# Patient Record
Sex: MALE | Race: OTHER | URBAN - METROPOLITAN AREA
[De-identification: names, ages, dates, MRNs, and addresses within clinical notes are randomized per-mention and may not be internally consistent; named-entity substitution may affect disease eponyms.]

---

## 2021-10-10 ENCOUNTER — INPATIENT (INPATIENT)
Facility: HOSPITAL | Age: 31
LOS: 5 days | Discharge: HOME | End: 2021-10-16
Attending: INTERNAL MEDICINE | Admitting: INTERNAL MEDICINE
Payer: MEDICAID

## 2021-10-10 VITALS
TEMPERATURE: 93 F | SYSTOLIC BLOOD PRESSURE: 133 MMHG | WEIGHT: 160.06 LBS | HEART RATE: 100 BPM | HEIGHT: 68 IN | OXYGEN SATURATION: 91 % | DIASTOLIC BLOOD PRESSURE: 81 MMHG | RESPIRATION RATE: 36 BRPM

## 2021-10-10 LAB
ALBUMIN SERPL ELPH-MCNC: 3.8 G/DL — SIGNIFICANT CHANGE UP (ref 3.3–5)
ALP SERPL-CCNC: 129 U/L — HIGH (ref 40–120)
ALT FLD-CCNC: 187 U/L — HIGH (ref 12–78)
ANION GAP SERPL CALC-SCNC: 17 MMOL/L — SIGNIFICANT CHANGE UP (ref 5–17)
ANION GAP SERPL CALC-SCNC: 21 MMOL/L — HIGH (ref 5–17)
ANION GAP SERPL CALC-SCNC: 28 MMOL/L — HIGH (ref 5–17)
APPEARANCE UR: CLEAR — SIGNIFICANT CHANGE UP
AST SERPL-CCNC: 82 U/L — HIGH (ref 15–37)
B-OH-BUTYR SERPL-SCNC: 10.2 MMOL/L — HIGH
BACTERIA # UR AUTO: ABNORMAL
BASE EXCESS BLDA CALC-SCNC: -28.9 MMOL/L — LOW (ref -2–2)
BASE EXCESS BLDV CALC-SCNC: -16.3 MMOL/L — LOW (ref -2–2)
BASOPHILS # BLD AUTO: 0 K/UL — SIGNIFICANT CHANGE UP (ref 0–0.2)
BASOPHILS NFR BLD AUTO: 0 % — SIGNIFICANT CHANGE UP (ref 0–2)
BILIRUB SERPL-MCNC: 0.8 MG/DL — SIGNIFICANT CHANGE UP (ref 0.2–1.2)
BILIRUB UR-MCNC: NEGATIVE — SIGNIFICANT CHANGE UP
BLOOD GAS COMMENTS, VENOUS: SIGNIFICANT CHANGE UP
BLOOD GAS COMMENTS, VENOUS: SIGNIFICANT CHANGE UP
BLOOD GAS COMMENTS: SIGNIFICANT CHANGE UP
BLOOD GAS COMMENTS: SIGNIFICANT CHANGE UP
BLOOD GAS SOURCE: SIGNIFICANT CHANGE UP
BUN SERPL-MCNC: 11 MG/DL — SIGNIFICANT CHANGE UP (ref 7–23)
BUN SERPL-MCNC: 16 MG/DL — SIGNIFICANT CHANGE UP (ref 7–23)
BUN SERPL-MCNC: 22 MG/DL — SIGNIFICANT CHANGE UP (ref 7–23)
CALCIUM SERPL-MCNC: 8 MG/DL — LOW (ref 8.5–10.1)
CALCIUM SERPL-MCNC: 8.2 MG/DL — LOW (ref 8.5–10.1)
CALCIUM SERPL-MCNC: 8.3 MG/DL — LOW (ref 8.5–10.1)
CHLORIDE SERPL-SCNC: 100 MMOL/L — SIGNIFICANT CHANGE UP (ref 96–108)
CHLORIDE SERPL-SCNC: 102 MMOL/L — SIGNIFICANT CHANGE UP (ref 96–108)
CHLORIDE SERPL-SCNC: 88 MMOL/L — LOW (ref 96–108)
CK SERPL-CCNC: 323 U/L — HIGH (ref 26–308)
CO2 SERPL-SCNC: 12 MMOL/L — LOW (ref 22–31)
CO2 SERPL-SCNC: 5 MMOL/L — CRITICAL LOW (ref 22–31)
CO2 SERPL-SCNC: 8 MMOL/L — CRITICAL LOW (ref 22–31)
COLOR SPEC: YELLOW — SIGNIFICANT CHANGE UP
CREAT SERPL-MCNC: 0.57 MG/DL — SIGNIFICANT CHANGE UP (ref 0.5–1.3)
CREAT SERPL-MCNC: 0.6 MG/DL — SIGNIFICANT CHANGE UP (ref 0.5–1.3)
CREAT SERPL-MCNC: 1.09 MG/DL — SIGNIFICANT CHANGE UP (ref 0.5–1.3)
DIFF PNL FLD: ABNORMAL
EOSINOPHIL # BLD AUTO: 0 K/UL — SIGNIFICANT CHANGE UP (ref 0–0.5)
EOSINOPHIL NFR BLD AUTO: 0 % — SIGNIFICANT CHANGE UP (ref 0–6)
ETHANOL SERPL-MCNC: <10 MG/DL — SIGNIFICANT CHANGE UP (ref 0–10)
FLUAV AG NPH QL: SIGNIFICANT CHANGE UP
FLUBV AG NPH QL: SIGNIFICANT CHANGE UP
GAS PNL BLDV: SIGNIFICANT CHANGE UP
GLUCOSE BLDC GLUCOMTR-MCNC: 167 MG/DL — HIGH (ref 70–99)
GLUCOSE BLDC GLUCOMTR-MCNC: 174 MG/DL — HIGH (ref 70–99)
GLUCOSE BLDC GLUCOMTR-MCNC: 175 MG/DL — HIGH (ref 70–99)
GLUCOSE BLDC GLUCOMTR-MCNC: 182 MG/DL — HIGH (ref 70–99)
GLUCOSE BLDC GLUCOMTR-MCNC: 184 MG/DL — HIGH (ref 70–99)
GLUCOSE BLDC GLUCOMTR-MCNC: 193 MG/DL — HIGH (ref 70–99)
GLUCOSE BLDC GLUCOMTR-MCNC: 235 MG/DL — HIGH (ref 70–99)
GLUCOSE BLDC GLUCOMTR-MCNC: 285 MG/DL — HIGH (ref 70–99)
GLUCOSE BLDC GLUCOMTR-MCNC: 293 MG/DL — HIGH (ref 70–99)
GLUCOSE BLDC GLUCOMTR-MCNC: 495 MG/DL — CRITICAL HIGH (ref 70–99)
GLUCOSE BLDC GLUCOMTR-MCNC: 538 MG/DL — CRITICAL HIGH (ref 70–99)
GLUCOSE SERPL-MCNC: 174 MG/DL — HIGH (ref 70–99)
GLUCOSE SERPL-MCNC: 177 MG/DL — HIGH (ref 70–99)
GLUCOSE SERPL-MCNC: 497 MG/DL — CRITICAL HIGH (ref 70–99)
GLUCOSE UR QL: 1000 MG/DL
HCO3 BLDA-SCNC: 2 MMOL/L — LOW (ref 21–29)
HCO3 BLDV-SCNC: 9 MMOL/L — LOW (ref 21–29)
HCT VFR BLD CALC: 42.8 % — SIGNIFICANT CHANGE UP (ref 39–50)
HGB BLD-MCNC: 14.1 G/DL — SIGNIFICANT CHANGE UP (ref 13–17)
HOROWITZ INDEX BLDA+IHG-RTO: 0.21 — SIGNIFICANT CHANGE UP
HOROWITZ INDEX BLDV+IHG-RTO: 21 — SIGNIFICANT CHANGE UP
HYPOSEGMENTATION: PRESENT — SIGNIFICANT CHANGE UP
KETONES UR-MCNC: ABNORMAL
LACTATE SERPL-SCNC: 1.9 MMOL/L — SIGNIFICANT CHANGE UP (ref 0.7–2)
LEUKOCYTE ESTERASE UR-ACNC: NEGATIVE — SIGNIFICANT CHANGE UP
LYMPHOCYTES # BLD AUTO: 1.22 K/UL — SIGNIFICANT CHANGE UP (ref 1–3.3)
LYMPHOCYTES # BLD AUTO: 5 % — LOW (ref 13–44)
MAGNESIUM SERPL-MCNC: 1.9 MG/DL — SIGNIFICANT CHANGE UP (ref 1.6–2.6)
MAGNESIUM SERPL-MCNC: 2.3 MG/DL — SIGNIFICANT CHANGE UP (ref 1.6–2.6)
MAGNESIUM SERPL-MCNC: 2.6 MG/DL — SIGNIFICANT CHANGE UP (ref 1.6–2.6)
MANUAL SMEAR VERIFICATION: SIGNIFICANT CHANGE UP
MCHC RBC-ENTMCNC: 30.8 PG — SIGNIFICANT CHANGE UP (ref 27–34)
MCHC RBC-ENTMCNC: 32.9 GM/DL — SIGNIFICANT CHANGE UP (ref 32–36)
MCV RBC AUTO: 93.4 FL — SIGNIFICANT CHANGE UP (ref 80–100)
MONOCYTES # BLD AUTO: 0.73 K/UL — SIGNIFICANT CHANGE UP (ref 0–0.9)
MONOCYTES NFR BLD AUTO: 3 % — SIGNIFICANT CHANGE UP (ref 2–14)
NEUTROPHILS # BLD AUTO: 22.36 K/UL — HIGH (ref 1.8–7.4)
NEUTROPHILS NFR BLD AUTO: 77 % — SIGNIFICANT CHANGE UP (ref 43–77)
NEUTS BAND # BLD: 15 % — HIGH (ref 0–8)
NEUTS VAC BLD QL SMEAR: SLIGHT — SIGNIFICANT CHANGE UP
NITRITE UR-MCNC: NEGATIVE — SIGNIFICANT CHANGE UP
NRBC # BLD: 1 /100 — HIGH (ref 0–0)
NRBC # BLD: SIGNIFICANT CHANGE UP /100 WBCS (ref 0–0)
PCO2 BLDA: 10 MMHG — LOW (ref 32–46)
PCO2 BLDV: 19 MMHG — LOW (ref 35–50)
PH BLD: 6.97 — CRITICAL LOW (ref 7.35–7.45)
PH BLDV: 7.29 — LOW (ref 7.35–7.45)
PH UR: 5 — SIGNIFICANT CHANGE UP (ref 5–8)
PHOSPHATE SERPL-MCNC: 0.9 MG/DL — CRITICAL LOW (ref 2.5–4.5)
PHOSPHATE SERPL-MCNC: 0.9 MG/DL — CRITICAL LOW (ref 2.5–4.5)
PLAT MORPH BLD: NORMAL — SIGNIFICANT CHANGE UP
PLATELET # BLD AUTO: 246 K/UL — SIGNIFICANT CHANGE UP (ref 150–400)
PO2 BLDA: 108 MMHG — SIGNIFICANT CHANGE UP (ref 74–108)
PO2 BLDV: 145 MMHG — HIGH (ref 25–45)
POLYCHROMASIA BLD QL SMEAR: SLIGHT — SIGNIFICANT CHANGE UP
POTASSIUM SERPL-MCNC: 3 MMOL/L — LOW (ref 3.5–5.3)
POTASSIUM SERPL-MCNC: 3.7 MMOL/L — SIGNIFICANT CHANGE UP (ref 3.5–5.3)
POTASSIUM SERPL-MCNC: 3.9 MMOL/L — SIGNIFICANT CHANGE UP (ref 3.5–5.3)
POTASSIUM SERPL-SCNC: 3 MMOL/L — LOW (ref 3.5–5.3)
POTASSIUM SERPL-SCNC: 3.7 MMOL/L — SIGNIFICANT CHANGE UP (ref 3.5–5.3)
POTASSIUM SERPL-SCNC: 3.9 MMOL/L — SIGNIFICANT CHANGE UP (ref 3.5–5.3)
PROT SERPL-MCNC: 8 GM/DL — SIGNIFICANT CHANGE UP (ref 6–8.3)
PROT UR-MCNC: 30 MG/DL
RBC # BLD: 4.58 M/UL — SIGNIFICANT CHANGE UP (ref 4.2–5.8)
RBC # FLD: 12.2 % — SIGNIFICANT CHANGE UP (ref 10.3–14.5)
RBC BLD AUTO: ABNORMAL
RBC CASTS # UR COMP ASSIST: ABNORMAL /HPF (ref 0–4)
SAO2 % BLDA: 96 % — SIGNIFICANT CHANGE UP (ref 92–96)
SAO2 % BLDV: 78 % — SIGNIFICANT CHANGE UP (ref 67–88)
SARS-COV-2 RNA SPEC QL NAA+PROBE: SIGNIFICANT CHANGE UP
SODIUM SERPL-SCNC: 121 MMOL/L — LOW (ref 135–145)
SODIUM SERPL-SCNC: 129 MMOL/L — LOW (ref 135–145)
SODIUM SERPL-SCNC: 131 MMOL/L — LOW (ref 135–145)
SP GR SPEC: 1.02 — SIGNIFICANT CHANGE UP (ref 1.01–1.02)
TOXIC GRANULES BLD QL SMEAR: PRESENT — SIGNIFICANT CHANGE UP
TROPONIN I SERPL-MCNC: <.015 NG/ML — SIGNIFICANT CHANGE UP (ref 0.01–0.04)
UROBILINOGEN FLD QL: NEGATIVE MG/DL — SIGNIFICANT CHANGE UP
WBC # BLD: 24.3 K/UL — HIGH (ref 3.8–10.5)
WBC # FLD AUTO: 24.3 K/UL — HIGH (ref 3.8–10.5)
WBC UR QL: SIGNIFICANT CHANGE UP

## 2021-10-10 PROCEDURE — 71045 X-RAY EXAM CHEST 1 VIEW: CPT | Mod: 26

## 2021-10-10 PROCEDURE — 71260 CT THORAX DX C+: CPT | Mod: 26,MA

## 2021-10-10 PROCEDURE — 70450 CT HEAD/BRAIN W/O DYE: CPT | Mod: 26,MA

## 2021-10-10 PROCEDURE — 99291 CRITICAL CARE FIRST HOUR: CPT

## 2021-10-10 PROCEDURE — 74177 CT ABD & PELVIS W/CONTRAST: CPT | Mod: 26,MA

## 2021-10-10 PROCEDURE — 93010 ELECTROCARDIOGRAM REPORT: CPT

## 2021-10-10 RX ORDER — CHLORHEXIDINE GLUCONATE 213 G/1000ML
1 SOLUTION TOPICAL
Refills: 0 | Status: DISCONTINUED | OUTPATIENT
Start: 2021-10-10 | End: 2021-10-16

## 2021-10-10 RX ORDER — INFLUENZA VIRUS VACCINE 15; 15; 15; 15 UG/.5ML; UG/.5ML; UG/.5ML; UG/.5ML
0.5 SUSPENSION INTRAMUSCULAR ONCE
Refills: 0 | Status: DISCONTINUED | OUTPATIENT
Start: 2021-10-10 | End: 2021-10-16

## 2021-10-10 RX ORDER — INSULIN HUMAN 100 [IU]/ML
6 INJECTION, SOLUTION SUBCUTANEOUS
Qty: 100 | Refills: 0 | Status: DISCONTINUED | OUTPATIENT
Start: 2021-10-10 | End: 2021-10-11

## 2021-10-10 RX ORDER — CEFTRIAXONE 500 MG/1
1000 INJECTION, POWDER, FOR SOLUTION INTRAMUSCULAR; INTRAVENOUS ONCE
Refills: 0 | Status: COMPLETED | OUTPATIENT
Start: 2021-10-10 | End: 2021-10-10

## 2021-10-10 RX ORDER — POTASSIUM CHLORIDE 20 MEQ
10 PACKET (EA) ORAL
Refills: 0 | Status: COMPLETED | OUTPATIENT
Start: 2021-10-10 | End: 2021-10-11

## 2021-10-10 RX ORDER — ENOXAPARIN SODIUM 100 MG/ML
30 INJECTION SUBCUTANEOUS DAILY
Refills: 0 | Status: DISCONTINUED | OUTPATIENT
Start: 2021-10-10 | End: 2021-10-10

## 2021-10-10 RX ORDER — CEFTRIAXONE 500 MG/1
INJECTION, POWDER, FOR SOLUTION INTRAMUSCULAR; INTRAVENOUS
Refills: 0 | Status: DISCONTINUED | OUTPATIENT
Start: 2021-10-10 | End: 2021-10-12

## 2021-10-10 RX ORDER — MAGNESIUM SULFATE 500 MG/ML
2 VIAL (ML) INJECTION ONCE
Refills: 0 | Status: COMPLETED | OUTPATIENT
Start: 2021-10-10 | End: 2021-10-10

## 2021-10-10 RX ORDER — INSULIN HUMAN 100 [IU]/ML
10 INJECTION, SOLUTION SUBCUTANEOUS ONCE
Refills: 0 | Status: COMPLETED | OUTPATIENT
Start: 2021-10-10 | End: 2021-10-10

## 2021-10-10 RX ORDER — CEFTRIAXONE 500 MG/1
1000 INJECTION, POWDER, FOR SOLUTION INTRAMUSCULAR; INTRAVENOUS EVERY 24 HOURS
Refills: 0 | Status: DISCONTINUED | OUTPATIENT
Start: 2021-10-11 | End: 2021-10-12

## 2021-10-10 RX ORDER — ENOXAPARIN SODIUM 100 MG/ML
40 INJECTION SUBCUTANEOUS DAILY
Refills: 0 | Status: DISCONTINUED | OUTPATIENT
Start: 2021-10-10 | End: 2021-10-16

## 2021-10-10 RX ORDER — SODIUM CHLORIDE 9 MG/ML
2000 INJECTION, SOLUTION INTRAVENOUS ONCE
Refills: 0 | Status: COMPLETED | OUTPATIENT
Start: 2021-10-10 | End: 2021-10-10

## 2021-10-10 RX ORDER — PIPERACILLIN AND TAZOBACTAM 4; .5 G/20ML; G/20ML
3.38 INJECTION, POWDER, LYOPHILIZED, FOR SOLUTION INTRAVENOUS ONCE
Refills: 0 | Status: COMPLETED | OUTPATIENT
Start: 2021-10-10 | End: 2021-10-10

## 2021-10-10 RX ORDER — POTASSIUM PHOSPHATE, MONOBASIC POTASSIUM PHOSPHATE, DIBASIC 236; 224 MG/ML; MG/ML
30 INJECTION, SOLUTION INTRAVENOUS ONCE
Refills: 0 | Status: COMPLETED | OUTPATIENT
Start: 2021-10-10 | End: 2021-10-11

## 2021-10-10 RX ORDER — INSULIN HUMAN 100 [IU]/ML
6 INJECTION, SOLUTION SUBCUTANEOUS
Qty: 100 | Refills: 0 | Status: DISCONTINUED | OUTPATIENT
Start: 2021-10-10 | End: 2021-10-10

## 2021-10-10 RX ORDER — SODIUM CHLORIDE 9 MG/ML
1000 INJECTION, SOLUTION INTRAVENOUS
Refills: 0 | Status: DISCONTINUED | OUTPATIENT
Start: 2021-10-10 | End: 2021-10-11

## 2021-10-10 RX ORDER — SODIUM CHLORIDE 9 MG/ML
1000 INJECTION, SOLUTION INTRAVENOUS
Refills: 0 | Status: DISCONTINUED | OUTPATIENT
Start: 2021-10-10 | End: 2021-10-10

## 2021-10-10 RX ORDER — POTASSIUM CHLORIDE 20 MEQ
10 PACKET (EA) ORAL
Refills: 0 | Status: COMPLETED | OUTPATIENT
Start: 2021-10-10 | End: 2021-10-10

## 2021-10-10 RX ORDER — POTASSIUM PHOSPHATE, MONOBASIC POTASSIUM PHOSPHATE, DIBASIC 236; 224 MG/ML; MG/ML
30 INJECTION, SOLUTION INTRAVENOUS ONCE
Refills: 0 | Status: COMPLETED | OUTPATIENT
Start: 2021-10-10 | End: 2021-10-10

## 2021-10-10 RX ORDER — SODIUM CHLORIDE 9 MG/ML
3000 INJECTION, SOLUTION INTRAVENOUS ONCE
Refills: 0 | Status: COMPLETED | OUTPATIENT
Start: 2021-10-10 | End: 2021-10-10

## 2021-10-10 RX ADMIN — PIPERACILLIN AND TAZOBACTAM 200 GRAM(S): 4; .5 INJECTION, POWDER, LYOPHILIZED, FOR SOLUTION INTRAVENOUS at 14:30

## 2021-10-10 RX ADMIN — Medication 50 GRAM(S): at 20:35

## 2021-10-10 RX ADMIN — POTASSIUM PHOSPHATE, MONOBASIC POTASSIUM PHOSPHATE, DIBASIC 83.33 MILLIMOLE(S): 236; 224 INJECTION, SOLUTION INTRAVENOUS at 21:18

## 2021-10-10 RX ADMIN — SODIUM CHLORIDE 2000 MILLILITER(S): 9 INJECTION, SOLUTION INTRAVENOUS at 14:30

## 2021-10-10 RX ADMIN — ENOXAPARIN SODIUM 40 MILLIGRAM(S): 100 INJECTION SUBCUTANEOUS at 17:15

## 2021-10-10 RX ADMIN — SODIUM CHLORIDE 125 MILLILITER(S): 9 INJECTION, SOLUTION INTRAVENOUS at 16:05

## 2021-10-10 RX ADMIN — Medication 100 MILLIEQUIVALENT(S): at 15:57

## 2021-10-10 RX ADMIN — SODIUM CHLORIDE 125 MILLILITER(S): 9 INJECTION, SOLUTION INTRAVENOUS at 17:34

## 2021-10-10 RX ADMIN — CHLORHEXIDINE GLUCONATE 1 APPLICATION(S): 213 SOLUTION TOPICAL at 16:05

## 2021-10-10 RX ADMIN — INSULIN HUMAN 10 UNIT(S): 100 INJECTION, SOLUTION SUBCUTANEOUS at 13:52

## 2021-10-10 RX ADMIN — Medication 100 MILLIEQUIVALENT(S): at 17:15

## 2021-10-10 RX ADMIN — SODIUM CHLORIDE 3000 MILLILITER(S): 9 INJECTION, SOLUTION INTRAVENOUS at 13:52

## 2021-10-10 RX ADMIN — CEFTRIAXONE 100 MILLIGRAM(S): 500 INJECTION, POWDER, FOR SOLUTION INTRAMUSCULAR; INTRAVENOUS at 19:25

## 2021-10-10 RX ADMIN — INSULIN HUMAN 6 UNIT(S)/HR: 100 INJECTION, SOLUTION SUBCUTANEOUS at 15:33

## 2021-10-10 RX ADMIN — Medication 100 MILLIEQUIVALENT(S): at 14:30

## 2021-10-10 NOTE — H&P ADULT - HISTORY OF PRESENT ILLNESS
32yo M with unknown PMH presented to ED BIBEMS with AMS. Pt found by brother minimally responsive lying in his basement.   In ED found to be hyperglycemic to 497 wiht anion gap 28, serum bicarb 10, pH 6.9 and  30[facility] Pt found by brother minimally responsive lying in his basement.   In ED found to be hyperglycemic to 497 with anion gap 28, serum bicarb 10, pH 6.9. ICU consulted for DKA management.

## 2021-10-10 NOTE — ED ADULT NURSE NOTE - OBJECTIVE STATEMENT
pt 30 y/o male BIBA. found by brother in the basement unresponsive. pt appears to be lethargic and responsive to painful stimuli. skin intact.
68

## 2021-10-10 NOTE — ED ADULT NURSE NOTE - NSIMPLEMENTINTERV_GEN_ALL_ED
Implemented All Fall with Harm Risk Interventions:  [place] to call system. Call bell, personal items and telephone within reach. Instruct patient to call for assistance. Room bathroom lighting operational. Non-slip footwear when patient is off stretcher. Physically safe environment: no spills, clutter or unnecessary equipment. Stretcher in lowest position, wheels locked, appropriate side rails in place. Provide visual cue, wrist band, yellow gown, etc. Monitor gait and stability. Monitor for mental status changes and reorient to person, place, and time. Review medications for side effects contributing to fall risk. Reinforce activity limits and safety measures with patient and family. Provide visual clues: red socks.

## 2021-10-10 NOTE — ED PROVIDER NOTE - OBJECTIVE STATEMENT
31m unclear med hx pw ams. pt was found in his basement by brother, minimally responsive. ems brought him, blood sugar high. complained of cp. otherwise not speaking

## 2021-10-10 NOTE — ED PROVIDER NOTE - PHYSICAL EXAMINATION
gen - decreased responsiveness   neuro - not following commands  cv - tachycardia  resp - tachypnea  id - hypothermia   endo - blood sugar high high  eyes - eomi  skin - intact  msk - intact

## 2021-10-10 NOTE — ED ADULT NURSE NOTE - CHIEF COMPLAINT QUOTE
Chest Pain and found on floor,  ASA x4 PO given by EMS, RR 36, color pale, O2 Sat 91% Hypothermic rectal 93.1, not answering all questions color pale and thin, Thai speaking and report given to me by Faroese speaking EMS not responding to  services at this time

## 2021-10-10 NOTE — H&P ADULT - ATTENDING COMMENTS
30 y/o M presenting with new onset DM w/DKA. Likely T2DM.    - IV fluids  - Insulin gtt  - Endocrine consult  - Replete potassium for hypokalemia  - Admit to ICU for insulin gtt and q1hr FSG monitoring  - DVT prophylaxis  - Full Code

## 2021-10-10 NOTE — H&P ADULT - NSHPPHYSICALEXAM_GEN_ALL_CORE
PHYSICAL EXAM  GENERAL: Pt lying in stretcher in NAD, well-groomed, well-developed.  HEENT: NCAT, PERRL, EOMI, dry mucous membranes. Neck supple, FROM, no JVD.   NERVOUS SYSTEM:  AAOx3, good concentration, moving all four extremities, no focal neuro deficits; strength & sensation intact in b/l upper & lower extremities.  CHEST/LUNG: CTABL, no w/r/r  HEART: +S1S2, RRR, no m/r/g  ABDOMEN: Soft, nontender, nondistended; +BS  EXTREMITIES:  2+ peripheral pulses; no c/c/e  SKIN: No rashes or lesions

## 2021-10-10 NOTE — ED ADULT TRIAGE NOTE - CHIEF COMPLAINT QUOTE
Chest Pain and found on floor,  ASA x4 PO given, RR 36, color pale, O2 Sat 91% Chest Pain and found on floor,  ASA x4 PO given by EMS, RR 36, color pale, O2 Sat 91% Hypothermic rectal 93.1, not answering all questions color pale and thin, Vietnamese speaking and report given to me by Azeri speaking EMS not responding to  services at this time

## 2021-10-10 NOTE — H&P ADULT - NSHPLABSRESULTS_GEN_ALL_CORE
14.1   24.30 )-----------( 246      ( 10 Oct 2021 13:44 )             42.8     10-10    121<L>  |  88<L>  |  22  ----------------------------<  497<HH>  3.9   |  5<LL>  |  1.09    Ca    8.3<L>      10 Oct 2021 13:44  Mg     2.6     10-10    TPro  8.0  /  Alb  3.8  /  TBili  0.8  /  DBili  x   /  AST  82<H>  /  ALT  187<H>  /  AlkPhos  129<H>  10-10    Creatine Kinase, Serum (10.10.21 @ 13:44)    Creatine Kinase, Serum: 323 U/L    Blood Gas Profile - Arterial (10.10.21 @ 13:59)    Blood Gas Source: Arterial    Blood Gas Comments: bleeding or hematoma noted.    pH, Blood: 6.97    pCO2, Arterial: 10 mmHg    pO2, Arterial: 108 mmHg    HCO3, Arterial: 2 mmol/L    Base Excess, Arterial: -28.9 mmol/L    Oxygen Saturation, Arterial: 96 %    FIO2, Arterial: .21    Flu With COVID-19 By ENEIDA (10.10.21 @ 13:44)    SARS-CoV-2 Result: NotDetec: EUA/IVD    Influenza A Result: NotDetec: EUA/IVD    Influenza B Result: NotDetec: EUA/IVD

## 2021-10-10 NOTE — H&P ADULT - ASSESSMENT
32yo M presents with AMS, found to be in DKA, mild rhabdo.     -Neuro: Presents with AMS, CTH normal. Likely acute toxic metabolic encephalopathy 2/2 hyperglycemia. Mental status should improve as hyperglycemia improves.   -Cardio: Hemodynamically stable. Continue fluid resuscitation, maintain MAP>65.  -Resp: Saturating well, protecting airway.   -GI: NPO for now while on insulin gtt.   -Renal: No issues at present. Trend BUN/Cr, monitor UO. Serial BMPs, aggressive electrolyte repletion. Elevated CK, likely mild rhabdo. Continue fluid resuscitation.   -ID: Elevated WBC, Covid/RVP negative. F/u UA, blood cx.   -Heme: Lovenox for DVT ppx, trend H/H.   -Endocrine: DKA, continue insulin gtt, q1h FS. When AG closes, will transition to long-acting insulin. F/u A1C. Endo consult.   -Dispo: Admit to ICU    Seen with ICU attending Dr. Hull.  32yo M presents with AMS, found to be in DKA, mild rhabdo.     -Neuro: Presents with AMS, CTH normal. Likely acute toxic metabolic encephalopathy 2/2 hyperglycemia. Mental status should improve as hyperglycemia improves.   -Cardio: Hemodynamically stable. Continue fluid resuscitation, maintain MAP>65.  -Resp: Saturating well, protecting airway.   -GI: NPO for now while on insulin gtt.   -Renal: No issues at present. Trend BUN/Cr, monitor UO. Serial BMPs, aggressive electrolyte repletion. Elevated CK, likely mild rhabdo. Continue fluid resuscitation.   -ID: Elevated WBC, Covid/RVP negative. F/u UA, blood cx.   -Heme: Lovenox for DVT ppx, trend H/H.   -Endocrine: DKA, continue insulin gtt, q1h FS. When AG closes, will transition to long-acting insulin. F/u A1C and C-peptide. Endo consult.   -Dispo: Admit to ICU    Seen with ICU attending Dr. Hull.

## 2021-10-11 DIAGNOSIS — E10.65 TYPE 1 DIABETES MELLITUS WITH HYPERGLYCEMIA: ICD-10-CM

## 2021-10-11 LAB
A1C WITH ESTIMATED AVERAGE GLUCOSE RESULT: 9 % — HIGH (ref 4–5.6)
ALBUMIN SERPL ELPH-MCNC: 2.4 G/DL — LOW (ref 3.3–5)
ALP SERPL-CCNC: 73 U/L — SIGNIFICANT CHANGE UP (ref 40–120)
ALT FLD-CCNC: 184 U/L — HIGH (ref 12–78)
ANION GAP SERPL CALC-SCNC: 12 MMOL/L — SIGNIFICANT CHANGE UP (ref 5–17)
ANION GAP SERPL CALC-SCNC: 13 MMOL/L — SIGNIFICANT CHANGE UP (ref 5–17)
ANION GAP SERPL CALC-SCNC: 17 MMOL/L — SIGNIFICANT CHANGE UP (ref 5–17)
AST SERPL-CCNC: 151 U/L — HIGH (ref 15–37)
BASE EXCESS BLDV CALC-SCNC: -7.4 MMOL/L — LOW (ref -2–2)
BASOPHILS # BLD AUTO: 0.01 K/UL — SIGNIFICANT CHANGE UP (ref 0–0.2)
BASOPHILS NFR BLD AUTO: 0.1 % — SIGNIFICANT CHANGE UP (ref 0–2)
BILIRUB SERPL-MCNC: 0.6 MG/DL — SIGNIFICANT CHANGE UP (ref 0.2–1.2)
BLOOD GAS COMMENTS, VENOUS: SIGNIFICANT CHANGE UP
BUN SERPL-MCNC: 6 MG/DL — LOW (ref 7–23)
BUN SERPL-MCNC: 6 MG/DL — LOW (ref 7–23)
BUN SERPL-MCNC: 8 MG/DL — SIGNIFICANT CHANGE UP (ref 7–23)
C PEPTIDE SERPL-MCNC: 0.8 NG/ML — LOW (ref 1.1–4.4)
CALCIUM SERPL-MCNC: 7.6 MG/DL — LOW (ref 8.5–10.1)
CALCIUM SERPL-MCNC: 7.7 MG/DL — LOW (ref 8.5–10.1)
CALCIUM SERPL-MCNC: 8 MG/DL — LOW (ref 8.5–10.1)
CHLORIDE SERPL-SCNC: 104 MMOL/L — SIGNIFICANT CHANGE UP (ref 96–108)
CHLORIDE SERPL-SCNC: 105 MMOL/L — SIGNIFICANT CHANGE UP (ref 96–108)
CHLORIDE SERPL-SCNC: 99 MMOL/L — SIGNIFICANT CHANGE UP (ref 96–108)
CO2 SERPL-SCNC: 15 MMOL/L — LOW (ref 22–31)
CO2 SERPL-SCNC: 16 MMOL/L — LOW (ref 22–31)
CO2 SERPL-SCNC: 18 MMOL/L — LOW (ref 22–31)
COVID-19 SPIKE DOMAIN AB INTERP: POSITIVE
COVID-19 SPIKE DOMAIN ANTIBODY RESULT: 176 U/ML — HIGH
CREAT SERPL-MCNC: 0.34 MG/DL — LOW (ref 0.5–1.3)
CREAT SERPL-MCNC: 0.37 MG/DL — LOW (ref 0.5–1.3)
CREAT SERPL-MCNC: 0.51 MG/DL — SIGNIFICANT CHANGE UP (ref 0.5–1.3)
EOSINOPHIL # BLD AUTO: 0.01 K/UL — SIGNIFICANT CHANGE UP (ref 0–0.5)
EOSINOPHIL NFR BLD AUTO: 0.1 % — SIGNIFICANT CHANGE UP (ref 0–6)
ESTIMATED AVERAGE GLUCOSE: 212 MG/DL — HIGH (ref 68–114)
GAS PNL BLDV: SIGNIFICANT CHANGE UP
GLUCOSE BLDC GLUCOMTR-MCNC: 129 MG/DL — HIGH (ref 70–99)
GLUCOSE BLDC GLUCOMTR-MCNC: 146 MG/DL — HIGH (ref 70–99)
GLUCOSE BLDC GLUCOMTR-MCNC: 155 MG/DL — HIGH (ref 70–99)
GLUCOSE BLDC GLUCOMTR-MCNC: 158 MG/DL — HIGH (ref 70–99)
GLUCOSE BLDC GLUCOMTR-MCNC: 160 MG/DL — HIGH (ref 70–99)
GLUCOSE BLDC GLUCOMTR-MCNC: 160 MG/DL — HIGH (ref 70–99)
GLUCOSE BLDC GLUCOMTR-MCNC: 171 MG/DL — HIGH (ref 70–99)
GLUCOSE BLDC GLUCOMTR-MCNC: 185 MG/DL — HIGH (ref 70–99)
GLUCOSE BLDC GLUCOMTR-MCNC: 188 MG/DL — HIGH (ref 70–99)
GLUCOSE BLDC GLUCOMTR-MCNC: 201 MG/DL — HIGH (ref 70–99)
GLUCOSE BLDC GLUCOMTR-MCNC: 254 MG/DL — HIGH (ref 70–99)
GLUCOSE BLDC GLUCOMTR-MCNC: 257 MG/DL — HIGH (ref 70–99)
GLUCOSE BLDC GLUCOMTR-MCNC: 279 MG/DL — HIGH (ref 70–99)
GLUCOSE SERPL-MCNC: 153 MG/DL — HIGH (ref 70–99)
GLUCOSE SERPL-MCNC: 217 MG/DL — HIGH (ref 70–99)
GLUCOSE SERPL-MCNC: 308 MG/DL — HIGH (ref 70–99)
HCO3 BLDV-SCNC: 16 MMOL/L — LOW (ref 21–29)
HCT VFR BLD CALC: 27.6 % — LOW (ref 39–50)
HGB BLD-MCNC: 10.2 G/DL — LOW (ref 13–17)
HOROWITZ INDEX BLDV+IHG-RTO: 21 — SIGNIFICANT CHANGE UP
IMM GRANULOCYTES NFR BLD AUTO: 0.3 % — SIGNIFICANT CHANGE UP (ref 0–1.5)
LYMPHOCYTES # BLD AUTO: 1.33 K/UL — SIGNIFICANT CHANGE UP (ref 1–3.3)
LYMPHOCYTES # BLD AUTO: 14.3 % — SIGNIFICANT CHANGE UP (ref 13–44)
MAGNESIUM SERPL-MCNC: 1.7 MG/DL — SIGNIFICANT CHANGE UP (ref 1.6–2.6)
MAGNESIUM SERPL-MCNC: 2 MG/DL — SIGNIFICANT CHANGE UP (ref 1.6–2.6)
MAGNESIUM SERPL-MCNC: 2.1 MG/DL — SIGNIFICANT CHANGE UP (ref 1.6–2.6)
MCHC RBC-ENTMCNC: 30.8 PG — SIGNIFICANT CHANGE UP (ref 27–34)
MCHC RBC-ENTMCNC: 37 GM/DL — HIGH (ref 32–36)
MCV RBC AUTO: 83.4 FL — SIGNIFICANT CHANGE UP (ref 80–100)
MONOCYTES # BLD AUTO: 0.73 K/UL — SIGNIFICANT CHANGE UP (ref 0–0.9)
MONOCYTES NFR BLD AUTO: 7.9 % — SIGNIFICANT CHANGE UP (ref 2–14)
NEUTROPHILS # BLD AUTO: 7.17 K/UL — SIGNIFICANT CHANGE UP (ref 1.8–7.4)
NEUTROPHILS NFR BLD AUTO: 77.3 % — HIGH (ref 43–77)
NRBC # BLD: 0 /100 WBCS — SIGNIFICANT CHANGE UP (ref 0–0)
PCO2 BLDV: 24 MMHG — LOW (ref 35–50)
PH BLDV: 7.42 — SIGNIFICANT CHANGE UP (ref 7.35–7.45)
PHOSPHATE SERPL-MCNC: 1.2 MG/DL — LOW (ref 2.5–4.5)
PHOSPHATE SERPL-MCNC: 1.4 MG/DL — LOW (ref 2.5–4.5)
PHOSPHATE SERPL-MCNC: 1.9 MG/DL — LOW (ref 2.5–4.5)
PLATELET # BLD AUTO: 155 K/UL — SIGNIFICANT CHANGE UP (ref 150–400)
PO2 BLDV: 61 MMHG — HIGH (ref 25–45)
POTASSIUM SERPL-MCNC: 2.6 MMOL/L — CRITICAL LOW (ref 3.5–5.3)
POTASSIUM SERPL-MCNC: 2.9 MMOL/L — CRITICAL LOW (ref 3.5–5.3)
POTASSIUM SERPL-MCNC: 3 MMOL/L — LOW (ref 3.5–5.3)
POTASSIUM SERPL-SCNC: 2.6 MMOL/L — CRITICAL LOW (ref 3.5–5.3)
POTASSIUM SERPL-SCNC: 2.9 MMOL/L — CRITICAL LOW (ref 3.5–5.3)
POTASSIUM SERPL-SCNC: 3 MMOL/L — LOW (ref 3.5–5.3)
PROCALCITONIN SERPL-MCNC: 1.89 NG/ML — HIGH (ref 0.02–0.1)
PROT SERPL-MCNC: 5.3 GM/DL — LOW (ref 6–8.3)
RBC # BLD: 3.31 M/UL — LOW (ref 4.2–5.8)
RBC # FLD: 11.7 % — SIGNIFICANT CHANGE UP (ref 10.3–14.5)
SAO2 % BLDV: 95 % — HIGH (ref 67–88)
SARS-COV-2 IGG+IGM SERPL QL IA: 176 U/ML — HIGH
SARS-COV-2 IGG+IGM SERPL QL IA: POSITIVE
SODIUM SERPL-SCNC: 131 MMOL/L — LOW (ref 135–145)
SODIUM SERPL-SCNC: 133 MMOL/L — LOW (ref 135–145)
SODIUM SERPL-SCNC: 135 MMOL/L — SIGNIFICANT CHANGE UP (ref 135–145)
WBC # BLD: 9.28 K/UL — SIGNIFICANT CHANGE UP (ref 3.8–10.5)
WBC # FLD AUTO: 9.28 K/UL — SIGNIFICANT CHANGE UP (ref 3.8–10.5)

## 2021-10-11 PROCEDURE — 99233 SBSQ HOSP IP/OBS HIGH 50: CPT

## 2021-10-11 RX ORDER — DEXTROSE 50 % IN WATER 50 %
25 SYRINGE (ML) INTRAVENOUS ONCE
Refills: 0 | Status: DISCONTINUED | OUTPATIENT
Start: 2021-10-11 | End: 2021-10-12

## 2021-10-11 RX ORDER — GLUCAGON INJECTION, SOLUTION 0.5 MG/.1ML
1 INJECTION, SOLUTION SUBCUTANEOUS ONCE
Refills: 0 | Status: DISCONTINUED | OUTPATIENT
Start: 2021-10-11 | End: 2021-10-16

## 2021-10-11 RX ORDER — INSULIN LISPRO 100/ML
VIAL (ML) SUBCUTANEOUS
Refills: 0 | Status: DISCONTINUED | OUTPATIENT
Start: 2021-10-11 | End: 2021-10-12

## 2021-10-11 RX ORDER — INSULIN HUMAN 100 [IU]/ML
INJECTION, SOLUTION SUBCUTANEOUS
Refills: 0 | Status: DISCONTINUED | OUTPATIENT
Start: 2021-10-11 | End: 2021-10-11

## 2021-10-11 RX ORDER — POTASSIUM CHLORIDE 20 MEQ
20 PACKET (EA) ORAL
Refills: 0 | Status: COMPLETED | OUTPATIENT
Start: 2021-10-11 | End: 2021-10-11

## 2021-10-11 RX ORDER — ONDANSETRON 8 MG/1
4 TABLET, FILM COATED ORAL ONCE
Refills: 0 | Status: COMPLETED | OUTPATIENT
Start: 2021-10-11 | End: 2021-10-11

## 2021-10-11 RX ORDER — MAGNESIUM SULFATE 500 MG/ML
2 VIAL (ML) INJECTION ONCE
Refills: 0 | Status: COMPLETED | OUTPATIENT
Start: 2021-10-11 | End: 2021-10-11

## 2021-10-11 RX ORDER — POTASSIUM CHLORIDE 20 MEQ
40 PACKET (EA) ORAL EVERY 4 HOURS
Refills: 0 | Status: COMPLETED | OUTPATIENT
Start: 2021-10-11 | End: 2021-10-11

## 2021-10-11 RX ORDER — POTASSIUM CHLORIDE 20 MEQ
10 PACKET (EA) ORAL
Refills: 0 | Status: COMPLETED | OUTPATIENT
Start: 2021-10-11 | End: 2021-10-11

## 2021-10-11 RX ORDER — INSULIN GLARGINE 100 [IU]/ML
10 INJECTION, SOLUTION SUBCUTANEOUS EVERY MORNING
Refills: 0 | Status: DISCONTINUED | OUTPATIENT
Start: 2021-10-11 | End: 2021-10-11

## 2021-10-11 RX ORDER — SODIUM,POTASSIUM PHOSPHATES 278-250MG
1 POWDER IN PACKET (EA) ORAL
Refills: 0 | Status: COMPLETED | OUTPATIENT
Start: 2021-10-11 | End: 2021-10-12

## 2021-10-11 RX ORDER — HUMAN INSULIN 100 [IU]/ML
30 INJECTION, SUSPENSION SUBCUTANEOUS
Refills: 0 | Status: DISCONTINUED | OUTPATIENT
Start: 2021-10-12 | End: 2021-10-12

## 2021-10-11 RX ORDER — HUMAN INSULIN 100 [IU]/ML
10 INJECTION, SUSPENSION SUBCUTANEOUS AT BEDTIME
Refills: 0 | Status: DISCONTINUED | OUTPATIENT
Start: 2021-10-11 | End: 2021-10-12

## 2021-10-11 RX ORDER — DEXTROSE 50 % IN WATER 50 %
12.5 SYRINGE (ML) INTRAVENOUS ONCE
Refills: 0 | Status: DISCONTINUED | OUTPATIENT
Start: 2021-10-11 | End: 2021-10-12

## 2021-10-11 RX ORDER — PANTOPRAZOLE SODIUM 20 MG/1
40 TABLET, DELAYED RELEASE ORAL
Refills: 0 | Status: DISCONTINUED | OUTPATIENT
Start: 2021-10-11 | End: 2021-10-16

## 2021-10-11 RX ORDER — DEXTROSE 50 % IN WATER 50 %
15 SYRINGE (ML) INTRAVENOUS ONCE
Refills: 0 | Status: DISCONTINUED | OUTPATIENT
Start: 2021-10-11 | End: 2021-10-12

## 2021-10-11 RX ADMIN — ONDANSETRON 4 MILLIGRAM(S): 8 TABLET, FILM COATED ORAL at 09:58

## 2021-10-11 RX ADMIN — Medication 100 MILLIEQUIVALENT(S): at 01:07

## 2021-10-11 RX ADMIN — Medication 100 MILLIEQUIVALENT(S): at 07:55

## 2021-10-11 RX ADMIN — Medication 3: at 11:08

## 2021-10-11 RX ADMIN — ENOXAPARIN SODIUM 40 MILLIGRAM(S): 100 INJECTION SUBCUTANEOUS at 11:08

## 2021-10-11 RX ADMIN — Medication 1 TABLET(S): at 17:08

## 2021-10-11 RX ADMIN — Medication 40 MILLIEQUIVALENT(S): at 13:08

## 2021-10-11 RX ADMIN — HUMAN INSULIN 10 UNIT(S): 100 INJECTION, SUSPENSION SUBCUTANEOUS at 21:02

## 2021-10-11 RX ADMIN — Medication 20 MILLIEQUIVALENT(S): at 17:08

## 2021-10-11 RX ADMIN — PANTOPRAZOLE SODIUM 40 MILLIGRAM(S): 20 TABLET, DELAYED RELEASE ORAL at 07:58

## 2021-10-11 RX ADMIN — Medication 100 MILLIEQUIVALENT(S): at 01:58

## 2021-10-11 RX ADMIN — Medication 40 MILLIEQUIVALENT(S): at 10:05

## 2021-10-11 RX ADMIN — POTASSIUM PHOSPHATE, MONOBASIC POTASSIUM PHOSPHATE, DIBASIC 83.33 MILLIMOLE(S): 236; 224 INJECTION, SOLUTION INTRAVENOUS at 10:05

## 2021-10-11 RX ADMIN — Medication 100 MILLIEQUIVALENT(S): at 08:59

## 2021-10-11 RX ADMIN — SODIUM CHLORIDE 125 MILLILITER(S): 9 INJECTION, SOLUTION INTRAVENOUS at 02:01

## 2021-10-11 RX ADMIN — SODIUM CHLORIDE 125 MILLILITER(S): 9 INJECTION, SOLUTION INTRAVENOUS at 08:59

## 2021-10-11 RX ADMIN — CHLORHEXIDINE GLUCONATE 1 APPLICATION(S): 213 SOLUTION TOPICAL at 08:02

## 2021-10-11 RX ADMIN — INSULIN GLARGINE 10 UNIT(S): 100 INJECTION, SOLUTION SUBCUTANEOUS at 08:02

## 2021-10-11 RX ADMIN — Medication 3: at 16:21

## 2021-10-11 RX ADMIN — Medication 20 MILLIEQUIVALENT(S): at 20:57

## 2021-10-11 RX ADMIN — Medication 100 MILLIEQUIVALENT(S): at 00:06

## 2021-10-11 RX ADMIN — Medication 100 MILLIEQUIVALENT(S): at 06:23

## 2021-10-11 RX ADMIN — Medication 50 GRAM(S): at 12:31

## 2021-10-11 RX ADMIN — CEFTRIAXONE 100 MILLIGRAM(S): 500 INJECTION, POWDER, FOR SOLUTION INTRAMUSCULAR; INTRAVENOUS at 17:08

## 2021-10-11 NOTE — PROVIDER CONTACT NOTE (CRITICAL VALUE NOTIFICATION) - ACTION/TREATMENT ORDERED:
provider aware
provider aware
No intervention needed right now as patient is receiving k phos and additional 40 meq of potassium at 2 pm
provider aware

## 2021-10-11 NOTE — PROGRESS NOTE ADULT - ASSESSMENT
32yo Lithuanian speaking male with insulin dependent DM (reports taking insulin 70/30 : 10 units PM and 30 units AM) presents from home after being found on ground in basement with AMS, minimally responsive. Brought in by EMS and found to have blood sugar 538, AG 28, pH 6.97, HCO3: 2, hypothermic 93.1, WBC 24 with 15% bandemia. Admitted to ICU for DKA, dehydration, acute metabolic encephalopathy, hypothermia with leukocytosis, r/o sepsis, hypokalemia, hypophosphatemia.    NEURO  awake, alert, responsive  Guinean speaking nurse and physician interpreting  CT head negative  acute metabolic encephalopathy likely in setting of DKA  no focal neurological deficits on exam    ENDO  DKA   transitioned from insulin drip to NPH  pt states he is not on lantus as he cannot afford medication, on 70/30 as that is what he can pay for with insurance  will placed on NPH 10 units QHS and 30 units AM and continue sliding scale coverage  ENDO consult called    ID  empirically on ceftriaxone as pt was hypothermic with leukocytosis and bandemia on blood work  follow up blood cx and u cx  if cultures negative and pt clinically improved without further leukocytosis, hypothermia/hyperthermia can likely d/c antibx in next day or two    RENAL  supplement hypokalemia and hypophosphatemia  electrolyte abnormalities likely contributing to some leg cramping    GI  complaining of reflux: cont protonix  zofran as needed for nausea  LFTs elevated on admission, will repeat and trend. CT abdomen without gross abdominal findings.     PULM  7mm R pulmonary nodule noted incidentally found on CT   outpatient follow up    GEN  DVT prophylaxis: lovenox  FULL code  social work to help pt with financials and insurance   stable for transfer to medical floor  d/w pt plan of care and treatment; pt expresses understanding, all questions answered for pt (Guinean speaking nurse translating)

## 2021-10-11 NOTE — PROGRESS NOTE ADULT - SUBJECTIVE AND OBJECTIVE BOX
24 hr events:  weaned off insulin drip this morning  tolerating PO diet  chest pain and abdominal pain resolved  awake, alert, responsive    ## ROS:  + fatigue  no fever, no chills  no HA, dizziness  no sore throat, no visual or auditory changes  no SOB, no cough  + chest pain = resolved  + abdominal pain = resolved  + nausea = resolved  + diarrhea = resolved  no emesis  + gastric reflux  + leg pain/cramps  no back pain    *reports living with sister  *denies smoking  *drinks on weekends socially: last drink 15 days ago  *denies recreational drug us    ## Labs:  CBC:                        10.2   9.28  )-----------( 155      ( 11 Oct 2021 05:08 )             27.6     Chem:  10-11    131<L>  |  99  |  6<L>  ----------------------------<  308<H>  3.0<L>   |  15<L>  |  0.34<L>    Ca    8.0<L>      11 Oct 2021 15:48  Phos  1.9     10-11  Mg     2.1     10-11    TPro  5.3<L>  /  Alb  2.4<L>  /  TBili  0.6  /  DBili  x   /  AST  151<H>  /  ALT  184<H>  /  AlkPhos  73  10-11    Procalcitonin, Serum (10.10.21 @ 23:28)    Procalcitonin, Serum: 1.89 ng/mL    A1C with Estimated Average Glucose (10.10.21 @ 23:27)    A1C with Estimated Average Glucose Result: 9.0 %    Estimated Average Glucose: 212 mg/dL    ## Imaging:  CT head < from: CT Head No Cont (10.10.21 @ 14:21) >  Normal non-contrast CT of the brain.      CT chest/abdomen < from: CT Chest w/ IV Cont (10.10.21 @ 14:22) >  CHEST:  LUNGS AND LARGE AIRWAYS: Patent central airways. 7 mm nodule right lower lobe lung parenchyma (series 2, image 89), indeterminate.  PLEURA: No pleural effusion.  VESSELS: Within normal limits.  HEART: Heart size is normal. No pericardial effusion.  MEDIASTINUM AND KAYLENE: No lymphadenopathy.  CHEST WALL AND LOWER NECK: Within normal limits.    ABDOMEN AND PELVIS:  LIVER: Within normal limits.  BILE DUCTS: Normal caliber.  GALLBLADDER: Within normal limits.  SPLEEN: Within normal limits.  PANCREAS: Within normal limits.  ADRENALS: Within normal limits.  KIDNEYS/URETERS: Within normal limits.    BLADDER: Within normal limits.  REPRODUCTIVE ORGANS: Prostate within normal limits.    BOWEL: No bowel obstruction. Appendix is normal.  PERITONEUM: No ascites.  VESSELS: Within normal limits.  RETROPERITONEUM/LYMPH NODES: No lymphadenopathy.  ABDOMINAL WALL: Within normal limits.  BONES: Chronic appearing deformity of the distal right clavicle identified.      ## Medications:   cefTRIAXone   IVPB 1000 milliGRAM(s) IV Intermittent every 24 hours        dextrose 40% Gel 15 Gram(s) Oral once  dextrose 50% Injectable 25 Gram(s) IV Push once  dextrose 50% Injectable 12.5 Gram(s) IV Push once  dextrose 50% Injectable 25 Gram(s) IV Push once  glucagon  Injectable 1 milliGRAM(s) IntraMuscular once  insulin lispro (ADMELOG) corrective regimen sliding scale   SubCutaneous three times a day before meals  insulin NPH human recombinant 10 Unit(s) SubCutaneous at bedtime    enoxaparin Injectable 40 milliGRAM(s) SubCutaneous daily    pantoprazole    Tablet 40 milliGRAM(s) Oral before breakfast        ## Vitals:  T(C): 37.3 (10-11-21 @ 19:16), Max: 37.6 (10-11-21 @ 06:00)  HR: 113 (10-11-21 @ 19:20) (107 - 131)  BP: 121/83 (10-11-21 @ 19:00) (102/63 - 126/80)  BP(mean): 92 (10-11-21 @ 19:00) (68 - 94)  RR: 24 (10-11-21 @ 19:20) (17 - 32)  SpO2: 100% (10-11-21 @ 19:20) (100% - 100%)      ABG: ABG - ( 10 Oct 2021 13:59 )  pH, Arterial:  pH, Blood: 6.97  /  pCO2: 10    /  pO2: 108   / HCO3: 2     / Base Excess: -28.9 /  SaO2: 96          10-10 @ 07:01  -  10-11 @ 07:00  --------------------------------------------------------  IN: 3376 mL / OUT: 1800 mL / NET: 1576 mL    10-11 @ 07:01  -  10-11 @ 19:32  --------------------------------------------------------  IN: 2119 mL / OUT: 2000 mL / NET: 119 mL          ## P/E:  Gen: lying comfortably in bed in no apparent distress  HEENT: EOMI  Resp: CTA B/L , no wheeze, no rales  CVS: mild tachycardia  Abd: soft NT/ND +BS  Ext: no c/c/e  Neuro: A&Ox3    CENTRAL LINE: [ ] YES [x ] NO  LOCATION:   DATE INSERTED:  REMOVE: [ ] YES [ ] NO      HORVATH: [ ] YES [x ] NO    DATE INSERTED:  REMOVE:  [ ] YES [ ] NO      A-LINE:  [ ] YES [x ] NO  LOCATION:   DATE INSERTED:  REMOVE:  [ ] YES [ ] NO  EXPLAIN:    GLOBAL ISSUE/BEST PRACTICE:  Analgesia: n/a  Sedation: n/a  HOB elevation: yes  Stress ulcer prophylaxis: protonix  VTE prophylaxis: lovenox  Oral Care: n/a  Glycemic control: NPH, insulin sliding scale coverage  Nutrition: diabetic diet    CODE STATUS: [x ] full code  [ ] DNR  [ ] DNI  [ ] [facility]  Goals of care discussion: [x ] yes

## 2021-10-11 NOTE — CHART NOTE - NSCHARTNOTEFT_GEN_A_CORE
32yo Telugu speaking male with insulin dependent DM (reports taking insulin 70/30 : 10 units PM and 30 units AM) presents from home after being found on ground in basement with AMS, minimally responsive. Brought in by EMS and found to have blood sugar 538, AG 28, pH 6.97, HCO3: 2, hypothermic 93.1, WBC 24 with 15% bandemia. Admitted to ICU for DKA, dehydration, acute metabolic encephalopathy, hypothermia with leukocytosis, r/o sepsis, electrolyte derangements (hypokalemia, hypophosphatemia), abdominal and chest pain with nausea/diarrhea which resolved since admission. Treated with insulin drip and switched to NPH. Due to insurance issues and financial constraints pt states he cannot afford lantus. s/p 5L IVF bolus, blood and urine cx pending. Empirically on ceftriaxone r/o sepsis. CT head negative for acute pathology. CT chest/abdomen/pelvis without acute pathology. Pt with incidental 7mm R pulmonary nodule that should be followed up as outpatient. LFTs also slightly elevated on admission with a negative CT abdomen. Placed on protonix for complaints of gastric reflux. Stable for transfer to medical floor. Endocrine consult called in. Pending social work consult. Signout/hand off given to hospitalist.

## 2021-10-11 NOTE — CONSULT NOTE ADULT - PROBLEM SELECTOR RECOMMENDATION 9
Check C-Peptide to classify type of diabetes   Continue with the current  regimen while inpatient   may need to change to Lantus and prandial lispro   while inpatient Finger Sticks should be in 140-180 range   Patient should have strict diet control and do exercise as tolerated upon discharge   Thank You for the courtesy of this consultation !!!

## 2021-10-11 NOTE — CONSULT NOTE ADULT - SUBJECTIVE AND OBJECTIVE BOX
Patient is a 31y old  Male who presents with a chief complaint of DKA (11 Oct 2021 19:32)      Reason For Consult: Diabetic Ketoacidosis     HPI:  32yo M with unknown PMH presented to ED BIBEMS with AMS. Pt found by brother minimally responsive lying in his basement.   In ED found to be hyperglycemic to 497 with anion gap 28, serum bicarb 10, pH 6.9. ICU consulted for DKA management.  (10 Oct 2021 15:16)    Diabetic Ketoacidosis resolved , on NPH 30 / 10 units and Lispro sliding scale coverage with meals   Finger-sticks are in high 100's and low 200's   PAST MEDICAL & SURGICAL HISTORY:      FAMILY HISTORY:        Social History:    MEDICATIONS  (STANDING):  cefTRIAXone   IVPB      cefTRIAXone   IVPB 1000 milliGRAM(s) IV Intermittent every 24 hours  chlorhexidine 2% Cloths 1 Application(s) Topical <User Schedule>  dextrose 40% Gel 15 Gram(s) Oral once  dextrose 50% Injectable 25 Gram(s) IV Push once  dextrose 50% Injectable 12.5 Gram(s) IV Push once  dextrose 50% Injectable 25 Gram(s) IV Push once  enoxaparin Injectable 40 milliGRAM(s) SubCutaneous daily  glucagon  Injectable 1 milliGRAM(s) IntraMuscular once  influenza   Vaccine 0.5 milliLiter(s) IntraMuscular once  insulin lispro (ADMELOG) corrective regimen sliding scale   SubCutaneous three times a day before meals  insulin NPH human recombinant 10 Unit(s) SubCutaneous at bedtime  pantoprazole    Tablet 40 milliGRAM(s) Oral before breakfast  potassium chloride    Tablet ER 20 milliEquivalent(s) Oral every 2 hours  potassium phosphate / sodium phosphate Tablet (K-PHOS No. 2) 1 Tablet(s) Oral four times a day    MEDICATIONS  (PRN):      REVIEW OF SYSTEMS:  CONSTITUTIONAL:  as per HPI  HEENT:  Eyes:  No diplopia or blurred vision.   ENT:  No earache, sore throat or runny nose.  CARDIOVASCULAR:  No chest pain .  RESPIRATORY:  No cough, shortness of breath, PND or orthopnea.  GASTROINTESTINAL:  No nausea, vomiting or diarrhea.  GENITOURINARY:  No dysuria, frequency or urgency. No Blood in urine  MUSCULOSKELETAL:  no joint aches, no muscle pain, myalgia  SKIN:  No change in skin, hair or nails.  NEUROLOGIC:  No paresthesias, fasciculations, seizures or weakness.  PSYCHIATRIC:  No disorder of thought or mood.  ENDOCRINE:  No heat or cold intolerance, polyuria or polydipsia. abnormal weight gain or loss, oral thrush  HEMATOLOGICAL:  No easy bruising or bleeding.     T(C): 37.3 (10-11-21 @ 19:16), Max: 37.6 (10-11-21 @ 06:00)  HR: 122 (10-11-21 @ 20:00) (107 - 131)  BP: 126/80 (10-11-21 @ 20:00) (102/63 - 126/80)  RR: 22 (10-11-21 @ 20:00) (17 - 32)  SpO2: 100% (10-11-21 @ 20:00) (100% - 100%)  Wt(kg): --    PHYSICAL EXAM:  GENERAL: NAD, well-groomed, well-developed  HEAD:  Atraumatic, Normocephalic  EYES: PERRLA, conjunctiva and sclera clear  ENMT: No  exudates,, Moist mucous membranes,, No lesions  NECK: Supple, No JVD,   NERVOUS SYSTEM:  Alert & Oriented   CHEST/LUNG: Clear to percussion bilaterally; No rales, rhonchi, wheezing, or rubs  HEART: Regular rate and rhythm; No murmurs, rubs, or gallops  ABDOMEN: Soft, Nontender, Nondistended; Bowel sounds present  EXTREMITIES:  2+ Peripheral Pulses, No clubbing, cyanosis, or edema  LYMPH: No lymphadenopathy noted  SKIN: No rashes or lesions    CAPILLARY BLOOD GLUCOSE      POCT Blood Glucose.: 257 mg/dL (11 Oct 2021 16:18)  POCT Blood Glucose.: 254 mg/dL (11 Oct 2021 11:07)  POCT Blood Glucose.: 158 mg/dL (11 Oct 2021 09:05)  POCT Blood Glucose.: 185 mg/dL (11 Oct 2021 08:01)  POCT Blood Glucose.: 160 mg/dL (11 Oct 2021 06:58)  POCT Blood Glucose.: 160 mg/dL (11 Oct 2021 06:08)  POCT Blood Glucose.: 146 mg/dL (11 Oct 2021 04:59)  POCT Blood Glucose.: 129 mg/dL (11 Oct 2021 03:54)  POCT Blood Glucose.: 201 mg/dL (11 Oct 2021 02:56)  POCT Blood Glucose.: 188 mg/dL (11 Oct 2021 01:51)  POCT Blood Glucose.: 155 mg/dL (11 Oct 2021 00:51)  POCT Blood Glucose.: 171 mg/dL (11 Oct 2021 00:00)  POCT Blood Glucose.: 182 mg/dL (10 Oct 2021 23:02)  POCT Blood Glucose.: 175 mg/dL (10 Oct 2021 21:53)                            10.2   9.28  )-----------( 155      ( 11 Oct 2021 05:08 )             27.6       CMP:  10-11 @ 15:48  SGPT --  Albumin --   Alk Phos --   Anion Gap 17   SGOT --   Total Bili --   BUN 6   Calcium Total 8.0   CO2 15   Chloride 99   Creatinine 0.34   eGFR if    eGFR if non    Glucose 308   Potassium 3.0   Protein --   Sodium 131      Thyroid Function Tests:      Diabetes Tests: 10-11 @ 01:04 HbA1c -- C-Peptide 0.8       Parathyroids:     Adrenals:       Radiology:

## 2021-10-12 LAB
ALBUMIN SERPL ELPH-MCNC: 2.9 G/DL — LOW (ref 3.3–5)
ALP SERPL-CCNC: 88 U/L — SIGNIFICANT CHANGE UP (ref 40–120)
ALT FLD-CCNC: 211 U/L — HIGH (ref 12–78)
ANION GAP SERPL CALC-SCNC: 17 MMOL/L — SIGNIFICANT CHANGE UP (ref 5–17)
ANION GAP SERPL CALC-SCNC: 9 MMOL/L — SIGNIFICANT CHANGE UP (ref 5–17)
AST SERPL-CCNC: 135 U/L — HIGH (ref 15–37)
BASOPHILS # BLD AUTO: 0.01 K/UL — SIGNIFICANT CHANGE UP (ref 0–0.2)
BASOPHILS NFR BLD AUTO: 0.2 % — SIGNIFICANT CHANGE UP (ref 0–2)
BILIRUB SERPL-MCNC: 0.9 MG/DL — SIGNIFICANT CHANGE UP (ref 0.2–1.2)
BUN SERPL-MCNC: 3 MG/DL — LOW (ref 7–23)
BUN SERPL-MCNC: 5 MG/DL — LOW (ref 7–23)
C PEPTIDE SERPL-MCNC: 0.7 NG/ML — LOW (ref 1.1–4.4)
CALCIUM SERPL-MCNC: 8.7 MG/DL — SIGNIFICANT CHANGE UP (ref 8.5–10.1)
CALCIUM SERPL-MCNC: 9 MG/DL — SIGNIFICANT CHANGE UP (ref 8.5–10.1)
CHLORIDE SERPL-SCNC: 96 MMOL/L — SIGNIFICANT CHANGE UP (ref 96–108)
CHLORIDE SERPL-SCNC: 97 MMOL/L — SIGNIFICANT CHANGE UP (ref 96–108)
CO2 SERPL-SCNC: 18 MMOL/L — LOW (ref 22–31)
CO2 SERPL-SCNC: 26 MMOL/L — SIGNIFICANT CHANGE UP (ref 22–31)
CREAT SERPL-MCNC: 0.36 MG/DL — LOW (ref 0.5–1.3)
CREAT SERPL-MCNC: 0.6 MG/DL — SIGNIFICANT CHANGE UP (ref 0.5–1.3)
EOSINOPHIL # BLD AUTO: 0 K/UL — SIGNIFICANT CHANGE UP (ref 0–0.5)
EOSINOPHIL NFR BLD AUTO: 0 % — SIGNIFICANT CHANGE UP (ref 0–6)
GLUCOSE BLDC GLUCOMTR-MCNC: 186 MG/DL — HIGH (ref 70–99)
GLUCOSE BLDC GLUCOMTR-MCNC: 232 MG/DL — HIGH (ref 70–99)
GLUCOSE BLDC GLUCOMTR-MCNC: 311 MG/DL — HIGH (ref 70–99)
GLUCOSE BLDC GLUCOMTR-MCNC: 312 MG/DL — HIGH (ref 70–99)
GLUCOSE SERPL-MCNC: 231 MG/DL — HIGH (ref 70–99)
GLUCOSE SERPL-MCNC: 233 MG/DL — HIGH (ref 70–99)
GRAM STN FLD: SIGNIFICANT CHANGE UP
HCT VFR BLD CALC: 32.5 % — LOW (ref 39–50)
HGB BLD-MCNC: 11.8 G/DL — LOW (ref 13–17)
IMM GRANULOCYTES NFR BLD AUTO: 0.6 % — SIGNIFICANT CHANGE UP (ref 0–1.5)
LYMPHOCYTES # BLD AUTO: 1.53 K/UL — SIGNIFICANT CHANGE UP (ref 1–3.3)
LYMPHOCYTES # BLD AUTO: 29.7 % — SIGNIFICANT CHANGE UP (ref 13–44)
MAGNESIUM SERPL-MCNC: 1.9 MG/DL — SIGNIFICANT CHANGE UP (ref 1.6–2.6)
MAGNESIUM SERPL-MCNC: 2.1 MG/DL — SIGNIFICANT CHANGE UP (ref 1.6–2.6)
MCHC RBC-ENTMCNC: 30.8 PG — SIGNIFICANT CHANGE UP (ref 27–34)
MCHC RBC-ENTMCNC: 36.3 GM/DL — HIGH (ref 32–36)
MCV RBC AUTO: 84.9 FL — SIGNIFICANT CHANGE UP (ref 80–100)
MONOCYTES # BLD AUTO: 0.49 K/UL — SIGNIFICANT CHANGE UP (ref 0–0.9)
MONOCYTES NFR BLD AUTO: 9.5 % — SIGNIFICANT CHANGE UP (ref 2–14)
NEUTROPHILS # BLD AUTO: 3.1 K/UL — SIGNIFICANT CHANGE UP (ref 1.8–7.4)
NEUTROPHILS NFR BLD AUTO: 60 % — SIGNIFICANT CHANGE UP (ref 43–77)
NRBC # BLD: 0 /100 WBCS — SIGNIFICANT CHANGE UP (ref 0–0)
PHOSPHATE SERPL-MCNC: 1.8 MG/DL — LOW (ref 2.5–4.5)
PHOSPHATE SERPL-MCNC: 2.1 MG/DL — LOW (ref 2.5–4.5)
PLATELET # BLD AUTO: 185 K/UL — SIGNIFICANT CHANGE UP (ref 150–400)
POTASSIUM SERPL-MCNC: 2.6 MMOL/L — CRITICAL LOW (ref 3.5–5.3)
POTASSIUM SERPL-MCNC: 2.7 MMOL/L — CRITICAL LOW (ref 3.5–5.3)
POTASSIUM SERPL-SCNC: 2.6 MMOL/L — CRITICAL LOW (ref 3.5–5.3)
POTASSIUM SERPL-SCNC: 2.7 MMOL/L — CRITICAL LOW (ref 3.5–5.3)
PROT SERPL-MCNC: 6.4 GM/DL — SIGNIFICANT CHANGE UP (ref 6–8.3)
RBC # BLD: 3.83 M/UL — LOW (ref 4.2–5.8)
RBC # FLD: 12.4 % — SIGNIFICANT CHANGE UP (ref 10.3–14.5)
SODIUM SERPL-SCNC: 131 MMOL/L — LOW (ref 135–145)
SODIUM SERPL-SCNC: 132 MMOL/L — LOW (ref 135–145)
SPECIMEN SOURCE: SIGNIFICANT CHANGE UP
WBC # BLD: 5.16 K/UL — SIGNIFICANT CHANGE UP (ref 3.8–10.5)
WBC # FLD AUTO: 5.16 K/UL — SIGNIFICANT CHANGE UP (ref 3.8–10.5)

## 2021-10-12 PROCEDURE — 93010 ELECTROCARDIOGRAM REPORT: CPT

## 2021-10-12 PROCEDURE — 99232 SBSQ HOSP IP/OBS MODERATE 35: CPT

## 2021-10-12 PROCEDURE — 71045 X-RAY EXAM CHEST 1 VIEW: CPT | Mod: 26

## 2021-10-12 RX ORDER — DEXTROSE 50 % IN WATER 50 %
15 SYRINGE (ML) INTRAVENOUS ONCE
Refills: 0 | Status: DISCONTINUED | OUTPATIENT
Start: 2021-10-12 | End: 2021-10-16

## 2021-10-12 RX ORDER — SODIUM,POTASSIUM PHOSPHATES 278-250MG
1 POWDER IN PACKET (EA) ORAL
Refills: 0 | Status: COMPLETED | OUTPATIENT
Start: 2021-10-12 | End: 2021-10-12

## 2021-10-12 RX ORDER — INSULIN LISPRO 100/ML
VIAL (ML) SUBCUTANEOUS AT BEDTIME
Refills: 0 | Status: DISCONTINUED | OUTPATIENT
Start: 2021-10-12 | End: 2021-10-16

## 2021-10-12 RX ORDER — POTASSIUM CHLORIDE 20 MEQ
40 PACKET (EA) ORAL EVERY 6 HOURS
Refills: 0 | Status: COMPLETED | OUTPATIENT
Start: 2021-10-12 | End: 2021-10-12

## 2021-10-12 RX ORDER — INSULIN LISPRO 100/ML
VIAL (ML) SUBCUTANEOUS
Refills: 0 | Status: DISCONTINUED | OUTPATIENT
Start: 2021-10-12 | End: 2021-10-16

## 2021-10-12 RX ORDER — DEXTROSE 50 % IN WATER 50 %
25 SYRINGE (ML) INTRAVENOUS ONCE
Refills: 0 | Status: DISCONTINUED | OUTPATIENT
Start: 2021-10-12 | End: 2021-10-16

## 2021-10-12 RX ORDER — SODIUM CHLORIDE 9 MG/ML
1000 INJECTION, SOLUTION INTRAVENOUS
Refills: 0 | Status: DISCONTINUED | OUTPATIENT
Start: 2021-10-12 | End: 2021-10-16

## 2021-10-12 RX ORDER — POTASSIUM CHLORIDE 20 MEQ
10 PACKET (EA) ORAL
Refills: 0 | Status: COMPLETED | OUTPATIENT
Start: 2021-10-12 | End: 2021-10-12

## 2021-10-12 RX ORDER — INSULIN GLARGINE 100 [IU]/ML
21 INJECTION, SOLUTION SUBCUTANEOUS AT BEDTIME
Refills: 0 | Status: DISCONTINUED | OUTPATIENT
Start: 2021-10-12 | End: 2021-10-16

## 2021-10-12 RX ORDER — SODIUM CHLORIDE 9 MG/ML
1000 INJECTION, SOLUTION INTRAVENOUS
Refills: 0 | Status: DISCONTINUED | OUTPATIENT
Start: 2021-10-12 | End: 2021-10-12

## 2021-10-12 RX ORDER — INSULIN LISPRO 100/ML
7 VIAL (ML) SUBCUTANEOUS
Refills: 0 | Status: DISCONTINUED | OUTPATIENT
Start: 2021-10-12 | End: 2021-10-16

## 2021-10-12 RX ORDER — SODIUM CHLORIDE 9 MG/ML
1000 INJECTION, SOLUTION INTRAVENOUS
Refills: 0 | Status: DISCONTINUED | OUTPATIENT
Start: 2021-10-12 | End: 2021-10-14

## 2021-10-12 RX ORDER — METFORMIN HYDROCHLORIDE 850 MG/1
500 TABLET ORAL
Refills: 0 | Status: DISCONTINUED | OUTPATIENT
Start: 2021-10-12 | End: 2021-10-16

## 2021-10-12 RX ORDER — POTASSIUM CHLORIDE 20 MEQ
40 PACKET (EA) ORAL EVERY 4 HOURS
Refills: 0 | Status: COMPLETED | OUTPATIENT
Start: 2021-10-12 | End: 2021-10-12

## 2021-10-12 RX ORDER — GLUCAGON INJECTION, SOLUTION 0.5 MG/.1ML
1 INJECTION, SOLUTION SUBCUTANEOUS ONCE
Refills: 0 | Status: DISCONTINUED | OUTPATIENT
Start: 2021-10-12 | End: 2021-10-16

## 2021-10-12 RX ORDER — PANTOPRAZOLE SODIUM 20 MG/1
40 TABLET, DELAYED RELEASE ORAL
Refills: 0 | Status: DISCONTINUED | OUTPATIENT
Start: 2021-10-12 | End: 2021-10-12

## 2021-10-12 RX ORDER — HUMAN INSULIN 100 [IU]/ML
10 INJECTION, SUSPENSION SUBCUTANEOUS AT BEDTIME
Refills: 0 | Status: DISCONTINUED | OUTPATIENT
Start: 2021-10-12 | End: 2021-10-12

## 2021-10-12 RX ORDER — DEXTROSE 50 % IN WATER 50 %
12.5 SYRINGE (ML) INTRAVENOUS ONCE
Refills: 0 | Status: DISCONTINUED | OUTPATIENT
Start: 2021-10-12 | End: 2021-10-16

## 2021-10-12 RX ADMIN — Medication 100 MILLIEQUIVALENT(S): at 10:33

## 2021-10-12 RX ADMIN — Medication 1: at 08:12

## 2021-10-12 RX ADMIN — Medication 1 TABLET(S): at 00:23

## 2021-10-12 RX ADMIN — CHLORHEXIDINE GLUCONATE 1 APPLICATION(S): 213 SOLUTION TOPICAL at 06:04

## 2021-10-12 RX ADMIN — Medication 1 TABLET(S): at 11:57

## 2021-10-12 RX ADMIN — HUMAN INSULIN 30 UNIT(S): 100 INJECTION, SUSPENSION SUBCUTANEOUS at 08:13

## 2021-10-12 RX ADMIN — Medication 1 TABLET(S): at 17:36

## 2021-10-12 RX ADMIN — ENOXAPARIN SODIUM 40 MILLIGRAM(S): 100 INJECTION SUBCUTANEOUS at 11:57

## 2021-10-12 RX ADMIN — Medication 8: at 17:06

## 2021-10-12 RX ADMIN — Medication 4: at 11:56

## 2021-10-12 RX ADMIN — Medication 40 MILLIEQUIVALENT(S): at 10:32

## 2021-10-12 RX ADMIN — Medication 40 MILLIEQUIVALENT(S): at 16:22

## 2021-10-12 RX ADMIN — INSULIN GLARGINE 21 UNIT(S): 100 INJECTION, SOLUTION SUBCUTANEOUS at 21:45

## 2021-10-12 RX ADMIN — Medication 40 MILLIEQUIVALENT(S): at 21:44

## 2021-10-12 RX ADMIN — Medication 1 TABLET(S): at 06:03

## 2021-10-12 RX ADMIN — Medication 1 TABLET(S): at 21:44

## 2021-10-12 RX ADMIN — Medication 40 MILLIEQUIVALENT(S): at 17:08

## 2021-10-12 RX ADMIN — Medication 100 MILLIEQUIVALENT(S): at 13:45

## 2021-10-12 RX ADMIN — Medication 100 MILLIEQUIVALENT(S): at 11:56

## 2021-10-12 RX ADMIN — PANTOPRAZOLE SODIUM 40 MILLIGRAM(S): 20 TABLET, DELAYED RELEASE ORAL at 08:15

## 2021-10-12 NOTE — DIETITIAN INITIAL EVALUATION ADULT. - PERTINENT LABORATORY DATA
10-12 Na131 mmol/L<L> Glu 231 mg/dL<H> K+ 2.7 mmol/L<LL> Cr  0.36 mg/dL<L> BUN 3 mg/dL<L> WBC 5.16 K/uL 10-12 Phos 1.8 mg/dL<L> 10-12 Alb 2.9 g/dL<L>    10-10 HgbA1c 9.0%

## 2021-10-12 NOTE — PROGRESS NOTE ADULT - SUBJECTIVE AND OBJECTIVE BOX
Patient is a 31y old  Male who presents with a chief complaint of DKA (12 Oct 2021 14:52)      Interval History: finger sticks are > 300s   also on 40 units NPH in divided doses and Lispro sliding scale coverage with meals   C-Peptide 0.7 to 0.8, increased HbA1C     MEDICATIONS  (STANDING):  chlorhexidine 2% Cloths 1 Application(s) Topical <User Schedule>  dextrose 40% Gel 15 Gram(s) Oral once  dextrose 5%. 1000 milliLiter(s) (100 mL/Hr) IV Continuous <Continuous>  dextrose 5%. 1000 milliLiter(s) (50 mL/Hr) IV Continuous <Continuous>  dextrose 50% Injectable 25 Gram(s) IV Push once  dextrose 50% Injectable 12.5 Gram(s) IV Push once  dextrose 50% Injectable 25 Gram(s) IV Push once  enoxaparin Injectable 40 milliGRAM(s) SubCutaneous daily  glucagon  Injectable 1 milliGRAM(s) IntraMuscular once  glucagon  Injectable 1 milliGRAM(s) IntraMuscular once  influenza   Vaccine 0.5 milliLiter(s) IntraMuscular once  insulin glargine Injectable (LANTUS) 21 Unit(s) SubCutaneous at bedtime  insulin lispro (ADMELOG) corrective regimen sliding scale   SubCutaneous three times a day before meals  insulin lispro (ADMELOG) corrective regimen sliding scale   SubCutaneous at bedtime  insulin lispro Injectable (ADMELOG) 7 Unit(s) SubCutaneous three times a day before meals  metFORMIN 500 milliGRAM(s) Oral two times a day  pantoprazole    Tablet 40 milliGRAM(s) Oral before breakfast  potassium chloride    Tablet ER 40 milliEquivalent(s) Oral every 4 hours  potassium phosphate / sodium phosphate Tablet (K-PHOS No. 2) 1 Tablet(s) Oral four times a day with meals  sodium chloride 0.9% 1000 milliLiter(s) (100 mL/Hr) IV Continuous <Continuous>    MEDICATIONS  (PRN):      Allergies    No Known Allergies    Intolerances        REVIEW OF SYSTEMS:  CONSTITUTIONAL: no changes  EYES: No eye pain, visual disturbances, or discharge  ENMT:  No difficulty hearing, No sinus or throat pain  NECK: No pain or stiffness  RESPIRATORY: No cough, wheezing, chills or hemoptysis; No shortness of breath  CARDIOVASCULAR: No chest pain, palpitations or leg swelling  GASTROINTESTINAL: No abdominal or epigastric pain. No nausea, vomiting, or hematemesis; No diarrhea or constipation. No melena or hematochezia.  GENITOURINARY: No dysuria, frequency, hematuria, or incontinence  NEUROLOGICAL: No headaches, memory loss, loss of strength, numbness, or tremors  SKIN: No itching, burning, rashes, or lesions   ENDOCRINE: No heat or cold intolerance; No hair loss  MUSCULOSKELETAL: No joint pain or swelling; No muscle, back, or extremity pain  PSYCHIATRIC: No depression, anxiety, mood swings, or difficulty sleeping  HEME/LYMPH: No easy bruising, or bleeding gums  ALLERY AND IMMUNOLOGIC: No hives or eczema    Vital Signs Last 24 Hrs  T(C): 36.8 (12 Oct 2021 17:10), Max: 37.6 (12 Oct 2021 11:38)  T(F): 98.3 (12 Oct 2021 17:10), Max: 99.6 (12 Oct 2021 11:38)  HR: 110 (12 Oct 2021 17:10) (109 - 125)  BP: 123/82 (12 Oct 2021 17:10) (118/79 - 143/90)  BP(mean): 97 (11 Oct 2021 21:00) (97 - 97)  RR: 18 (12 Oct 2021 17:10) (18 - 21)  SpO2: 100% (12 Oct 2021 17:10) (100% - 100%)    PHYSICAL EXAM:  GENERAL:   HEAD: Atraumatic, Normocephalic  EYES: PERRLA, conjunctiva and sclera clear  ENMT: No  exudates,; Moist mucous membranes,, No lesions  NECK: Supple, No JVD, Normal thyroid  NERVOUS SYSTEM:  Alert & Oriented,   CHEST/LUNG: Clear to auscultation bilaterally; No rales, rhonchi, wheezing, or rubs  HEART: Regular rate and rhythm; No murmurs, rubs, or gallops  ABDOMEN: Soft, Nontender, Nondistended; Bowel sounds present  EXTREMITIES:  2+ Peripheral Pulses, no edema  SKIN: No rashes or lesions    LABS:        CAPILLARY BLOOD GLUCOSE      POCT Blood Glucose.: 311 mg/dL (12 Oct 2021 16:51)  POCT Blood Glucose.: 312 mg/dL (12 Oct 2021 11:13)  POCT Blood Glucose.: 186 mg/dL (12 Oct 2021 07:47)  POCT Blood Glucose.: 279 mg/dL (11 Oct 2021 21:02)    Lipid panel:           Thyroid:  Diabetes Tests:10-12 @ 09:01 HbA1c -- C-Peptide 0.7    Parathyroid Panel:  Adrenals:  RADIOLOGY & ADDITIONAL TESTS:    Imaging Personally Reviewed:  [ ] YES  [ ] NO    Consultant(s) Notes Reviewed:  [ ] YES  [ ] NO    Care Discussed with Consultants/Other Providers [ ] YES  [ ] NO

## 2021-10-12 NOTE — PROGRESS NOTE ADULT - ASSESSMENT
32yo Welsh speaking male with insulin dependent DM (reports taking insulin 70/30 : 10 units PM and 30 units AM) presents from home after being found on ground in basement with AMS, minimally responsive. Brought in by EMS and found to have blood sugar 538, AG 28, pH 6.97, HCO3: 2, hypothermic 93.1, WBC 24 with 15% bandemia. Admitted to ICU for DKA, dehydration, acute metabolic encephalopathy, hypothermia with leukocytosis, r/o sepsis, electrolyte derangements (hypokalemia, hypophosphatemia), abdominal and chest pain with nausea/diarrhea which resolved since admission. Treated with insulin drip and switched to NPH. Due to insurance issues and financial constraints pt states he cannot afford lantus. s/p 5L IVF bolus, blood and urine cx pending. Empirically on ceftriaxone r/o sepsis. CT head negative for acute pathology. CT chest/abdomen/pelvis without acute pathology. Pt with incidental 7mm R pulmonary nodule that should be followed up as outpatient. LFTs also slightly elevated on admission with a negative CT abdomen. Placed on protonix for complaints of gastric reflux.    DKA  -improving  -c/w insulin, endo following    supplement hypokalemia and hypophosphatemia  monitor     Had ep of bloody streaks while coughing today, cxr ordered, continue to monitor    no signs of infection,, monitor off of abx       7mm R pulmonary nodule noted incidentally found on CT   outpatient follow up

## 2021-10-12 NOTE — DIETITIAN INITIAL EVALUATION ADULT. - PERTINENT MEDS FT
MEDICATIONS  (STANDING):  chlorhexidine 2% Cloths 1 Application(s) Topical <User Schedule>  dextrose 40% Gel 15 Gram(s) Oral once  dextrose 50% Injectable 25 Gram(s) IV Push once  dextrose 50% Injectable 12.5 Gram(s) IV Push once  dextrose 50% Injectable 25 Gram(s) IV Push once  enoxaparin Injectable 40 milliGRAM(s) SubCutaneous daily  glucagon  Injectable 1 milliGRAM(s) IntraMuscular once  influenza   Vaccine 0.5 milliLiter(s) IntraMuscular once  insulin lispro (ADMELOG) corrective regimen sliding scale   SubCutaneous three times a day before meals  insulin NPH human recombinant 30 Unit(s) SubCutaneous before breakfast  insulin NPH human recombinant 10 Unit(s) SubCutaneous at bedtime  pantoprazole    Tablet 40 milliGRAM(s) Oral before breakfast  potassium chloride    Tablet ER 40 milliEquivalent(s) Oral every 6 hours    MEDICATIONS  (PRN):

## 2021-10-12 NOTE — DIETITIAN INITIAL EVALUATION ADULT. - OTHER INFO
Pt B[place]ian speaking; pt was offered & accepted  services (: [facility] #[id number]).  Pt admitted for DKA, dehydration, acute metabolic encephalopathy, hypothermia with leukocytosis, r/o sepsis, electrolyte derangements (hypokalemia, hypophosphatemia), abdominal and chest pain with nausea/diarrhea which resolved since admission. Incidentally, pt found to have pulmonary nodule on CT.  Pt lives with brother; pt does his own food shopping/cooking; independent for ALDs. Pt does not follow any dietary restrictions at home. Pt reported poor po intake/appetite x 1 month, due to nausea/vomiting/diarrhea issues (currently resolved), and only ate bread and crackers.  Pt with DM (A1c 9.0%; admitted for DKA; Endocrinology following pt); takes insulin 70/30: 10 units PM, 30 units AM. Per chart review, due to insurance/financial issues, pt states he cannot afford Lantus. Pt checks BG 2x daily; usually 150-160 mg/dL in AM & 180-200 mg/dL in PM.  Provided pt with nutrition counseling/educational materials (in B[place]ian); discussed various CHO foods with their recommended portion sizes, food plate method, etc.; pt appeared receptive to diet information.  Pt reported -150 lbs. x 1 month ago & current wt 129 lbs.; pt with visible muscle wasting/fat depletion (see below). Pt Chinese speaking; pt was offered & accepted  services (: [facility] #[id number]).  Pt admitted for DKA, dehydration, acute metabolic encephalopathy, hypothermia with leukocytosis, r/o sepsis, electrolyte derangements (hypokalemia, hypophosphatemia), abdominal and chest pain with nausea/diarrhea which resolved since admission. Incidentally, pt found to have pulmonary nodule on CT.  Pt lives with brother; pt does his own food shopping/cooking; independent for ALDs. Pt does not follow any dietary restrictions at home. Pt reported poor po intake/appetite x 1 month, due to nausea/vomiting/diarrhea issues (currently resolved), and only ate bread and crackers.  Pt with DM (A1c 9.0%; admitted for DKA; Endocrinology following pt); takes insulin 70/30: 10 units PM, 30 units AM. Per chart review, due to insurance/financial issues, pt states he cannot afford Lantus. Pt checks BG 2x daily; usually 150-160 mg/dL in AM & 180-200 mg/dL in PM.  Provided pt with nutrition counseling/educational materials (in Chinese); discussed various CHO foods with their recommended portion sizes, food plate method, etc.; pt appeared receptive to diet information.  Pt reported  lbs. x 1 month ago & current wt 129 lbs.; pt with visible muscle wasting/fat depletion (see below).

## 2021-10-12 NOTE — DIETITIAN NUTRITION RISK NOTIFICATION - TREATMENT: THE FOLLOWING DIET HAS BEEN RECOMMENDED
Diet, Consistent Carbohydrate w/Evening Snack:   Supplement Feeding Modality:  Oral  Glucerna Shake Cans or Servings Per Day:  1       Frequency:  Three Times a day (10-12-21 @ 15:30) [Pending Verification By Attending]  Diet, Consistent Carbohydrate w/Evening Snack (10-11-21 @ 07:18) [Active]

## 2021-10-12 NOTE — PROGRESS NOTE ADULT - SUBJECTIVE AND OBJECTIVE BOX
Patient is a 31y old  Male who presents with a chief complaint of DKA (11 Oct 2021 20:54)    INTERVAL HPI/OVERNIGHT EVENTS: no events     MEDICATIONS  (STANDING):  chlorhexidine 2% Cloths 1 Application(s) Topical <User Schedule>  dextrose 40% Gel 15 Gram(s) Oral once  dextrose 50% Injectable 25 Gram(s) IV Push once  dextrose 50% Injectable 12.5 Gram(s) IV Push once  dextrose 50% Injectable 25 Gram(s) IV Push once  enoxaparin Injectable 40 milliGRAM(s) SubCutaneous daily  glucagon  Injectable 1 milliGRAM(s) IntraMuscular once  influenza   Vaccine 0.5 milliLiter(s) IntraMuscular once  insulin lispro (ADMELOG) corrective regimen sliding scale   SubCutaneous three times a day before meals  insulin NPH human recombinant 30 Unit(s) SubCutaneous before breakfast  insulin NPH human recombinant 10 Unit(s) SubCutaneous at bedtime  pantoprazole    Tablet 40 milliGRAM(s) Oral before breakfast  potassium chloride    Tablet ER 40 milliEquivalent(s) Oral every 6 hours  potassium chloride  10 mEq/100 mL IVPB 10 milliEquivalent(s) IV Intermittent every 1 hour    MEDICATIONS  (PRN):    Allergies    No Known Allergies    Intolerances      REVIEW OF SYSTEMS:  All other systems reviewed and are negative    Vital Signs Last 24 Hrs  T(C): 37.5 (12 Oct 2021 12:00), Max: 37.6 (12 Oct 2021 11:38)  T(F): 99.5 (12 Oct 2021 12:00), Max: 99.6 (12 Oct 2021 11:38)  HR: 125 (12 Oct 2021 12:00) (109 - 131)  BP: 143/90 (12 Oct 2021 12:00) (110/70 - 143/90)  BP(mean): 97 (11 Oct 2021 21:00) (78 - 97)  RR: 18 (12 Oct 2021 12:00) (18 - 32)  SpO2: 100% (12 Oct 2021 12:00) (100% - 100%)  Daily     Daily   I&O's Summary    11 Oct 2021 07:01  -  12 Oct 2021 07:00  --------------------------------------------------------  IN: 2119 mL / OUT: 2900 mL / NET: -781 mL      CAPILLARY BLOOD GLUCOSE      POCT Blood Glucose.: 312 mg/dL (12 Oct 2021 11:13)  POCT Blood Glucose.: 186 mg/dL (12 Oct 2021 07:47)  POCT Blood Glucose.: 279 mg/dL (11 Oct 2021 21:02)  POCT Blood Glucose.: 257 mg/dL (11 Oct 2021 16:18)    PHYSICAL EXAM:  GENERAL: NAD,    HEAD:  Atraumatic, Normocephalic  EYES: EOMI, PERRLA, conjunctiva and sclera clear  ENMT: No tonsillar erythema, exudates, or enlargement; Moist mucous membranes, Good dentition, No lesions  NECK: Supple, No JVD, Normal thyroid  NERVOUS SYSTEM:  Alert & Oriented X3, Good concentration; Motor Strength 5/5 B/L upper and lower extremities; DTRs 2+ intact and symmetric  CHEST/LUNG: Clear to percussion bilaterally; No rales, rhonchi, wheezing, or rubs  HEART: Regular rate and rhythm; No murmurs, rubs, or gallops  ABDOMEN: Soft, Nontender, Nondistended; Bowel sounds present  EXTREMITIES:  2+ Peripheral Pulses, No clubbing, cyanosis, or edema  LYMPH: No lymphadenopathy noted  SKIN: No rashes or lesions    Labs                          11.8   5.16  )-----------( 185      ( 12 Oct 2021 06:33 )             32.5     10-12    131<L>  |  96  |  3<L>  ----------------------------<  231<H>  2.7<LL>   |  18<L>  |  0.36<L>    Ca    8.7      12 Oct 2021 06:33  Phos  1.8     10-12  Mg     2.1     10-12    TPro  6.4  /  Alb  2.9<L>  /  TBili  0.9  /  DBili  x   /  AST  135<H>  /  ALT  211<H>  /  AlkPhos  88  10-12      CARDIAC MARKERS ( 10 Oct 2021 13:44 )  <.015 ng/mL / x     / 323 U/L / x     / x          Urinalysis Basic - ( 10 Oct 2021 15:21 )    Color: Yellow / Appearance: Clear / S.020 / pH: x  Gluc: x / Ketone: Large  / Bili: Negative / Urobili: Negative mg/dL   Blood: x / Protein: 30 mg/dL / Nitrite: Negative   Leuk Esterase: Negative / RBC: 6-10 /HPF / WBC 0-2   Sq Epi: x / Non Sq Epi: x / Bacteria: Few        Culture - Blood (collected 10 Oct 2021 16:54)  Source: .Blood Blood  Preliminary Report (11 Oct 2021 17:02):    No growth to date.    Culture - Blood (collected 10 Oct 2021 16:54)  Source: .Blood Blood  Preliminary Report (11 Oct 2021 17:02):    No growth to date.                DVT prophylaxis: > Lovenox 40mg SQ daily  > Heparin   > SCD's

## 2021-10-13 LAB
ALBUMIN SERPL ELPH-MCNC: 3.1 G/DL — LOW (ref 3.3–5)
ALP SERPL-CCNC: 105 U/L — SIGNIFICANT CHANGE UP (ref 40–120)
ALT FLD-CCNC: 455 U/L — HIGH (ref 12–78)
ANION GAP SERPL CALC-SCNC: 9 MMOL/L — SIGNIFICANT CHANGE UP (ref 5–17)
AST SERPL-CCNC: 422 U/L — HIGH (ref 15–37)
BILIRUB SERPL-MCNC: 0.8 MG/DL — SIGNIFICANT CHANGE UP (ref 0.2–1.2)
BUN SERPL-MCNC: 5 MG/DL — LOW (ref 7–23)
CALCIUM SERPL-MCNC: 8.8 MG/DL — SIGNIFICANT CHANGE UP (ref 8.5–10.1)
CHLORIDE SERPL-SCNC: 99 MMOL/L — SIGNIFICANT CHANGE UP (ref 96–108)
CO2 SERPL-SCNC: 26 MMOL/L — SIGNIFICANT CHANGE UP (ref 22–31)
CREAT SERPL-MCNC: 0.58 MG/DL — SIGNIFICANT CHANGE UP (ref 0.5–1.3)
GLUCOSE BLDC GLUCOMTR-MCNC: 155 MG/DL — HIGH (ref 70–99)
GLUCOSE BLDC GLUCOMTR-MCNC: 230 MG/DL — HIGH (ref 70–99)
GLUCOSE BLDC GLUCOMTR-MCNC: 239 MG/DL — HIGH (ref 70–99)
GLUCOSE BLDC GLUCOMTR-MCNC: 274 MG/DL — HIGH (ref 70–99)
GLUCOSE SERPL-MCNC: 255 MG/DL — HIGH (ref 70–99)
HCT VFR BLD CALC: 34.3 % — LOW (ref 39–50)
HGB BLD-MCNC: 12.1 G/DL — LOW (ref 13–17)
MAGNESIUM SERPL-MCNC: 1.7 MG/DL — SIGNIFICANT CHANGE UP (ref 1.6–2.6)
MCHC RBC-ENTMCNC: 30.6 PG — SIGNIFICANT CHANGE UP (ref 27–34)
MCHC RBC-ENTMCNC: 35.3 GM/DL — SIGNIFICANT CHANGE UP (ref 32–36)
MCV RBC AUTO: 86.8 FL — SIGNIFICANT CHANGE UP (ref 80–100)
NRBC # BLD: 0 /100 WBCS — SIGNIFICANT CHANGE UP (ref 0–0)
PHOSPHATE SERPL-MCNC: 3.1 MG/DL — SIGNIFICANT CHANGE UP (ref 2.5–4.5)
PLATELET # BLD AUTO: 193 K/UL — SIGNIFICANT CHANGE UP (ref 150–400)
POTASSIUM SERPL-MCNC: 3.7 MMOL/L — SIGNIFICANT CHANGE UP (ref 3.5–5.3)
POTASSIUM SERPL-SCNC: 3.7 MMOL/L — SIGNIFICANT CHANGE UP (ref 3.5–5.3)
PROT SERPL-MCNC: 6.7 GM/DL — SIGNIFICANT CHANGE UP (ref 6–8.3)
RBC # BLD: 3.95 M/UL — LOW (ref 4.2–5.8)
RBC # FLD: 12.8 % — SIGNIFICANT CHANGE UP (ref 10.3–14.5)
SODIUM SERPL-SCNC: 134 MMOL/L — LOW (ref 135–145)
WBC # BLD: 4.29 K/UL — SIGNIFICANT CHANGE UP (ref 3.8–10.5)
WBC # FLD AUTO: 4.29 K/UL — SIGNIFICANT CHANGE UP (ref 3.8–10.5)

## 2021-10-13 PROCEDURE — 99232 SBSQ HOSP IP/OBS MODERATE 35: CPT

## 2021-10-13 PROCEDURE — 93010 ELECTROCARDIOGRAM REPORT: CPT

## 2021-10-13 RX ADMIN — ENOXAPARIN SODIUM 40 MILLIGRAM(S): 100 INJECTION SUBCUTANEOUS at 12:05

## 2021-10-13 RX ADMIN — PANTOPRAZOLE SODIUM 40 MILLIGRAM(S): 20 TABLET, DELAYED RELEASE ORAL at 07:59

## 2021-10-13 RX ADMIN — Medication 7 UNIT(S): at 12:04

## 2021-10-13 RX ADMIN — METFORMIN HYDROCHLORIDE 500 MILLIGRAM(S): 850 TABLET ORAL at 17:20

## 2021-10-13 RX ADMIN — INSULIN GLARGINE 21 UNIT(S): 100 INJECTION, SOLUTION SUBCUTANEOUS at 21:47

## 2021-10-13 RX ADMIN — Medication 6: at 08:00

## 2021-10-13 RX ADMIN — Medication 7 UNIT(S): at 16:27

## 2021-10-13 RX ADMIN — Medication 7 UNIT(S): at 08:06

## 2021-10-13 RX ADMIN — Medication 4: at 16:27

## 2021-10-13 RX ADMIN — Medication 4: at 12:04

## 2021-10-13 RX ADMIN — METFORMIN HYDROCHLORIDE 500 MILLIGRAM(S): 850 TABLET ORAL at 08:39

## 2021-10-13 NOTE — PROGRESS NOTE ADULT - ASSESSMENT
32yo Indonesian speaking male with insulin dependent DM (reports taking insulin 70/30 : 10 units PM and 30 units AM) presents from home after being found on ground in basement with AMS, admitted for DKA.    # DKA, uncontrolled DM - Ha1c 9.0.  Continue recommendations by Endocrine.  As pt uninsured, expect NPH / insulin coverage.  Counselled pt on diet modification, medication compliance and followup.    # Acute Metabolic Encephalopathy - due to above, now resolved.   # R pulmonary nodule - 7mm incidentally noted on CT chest.  Outpatient followup.   # Suspected GERD - trial of PPI.   # Hypothermia  - no S/s of active infection.  S/p Rocephin.  CT chest not suggestive of pneumonia.  UA, BCx unremarkable.  WBC, temp wnl.  Observe off antibiotics.  # Elevated LFTs - CT A/P was unremarkable.  Repeat LFTs in am.  # Inpatient DVT Prophylaxis - Lovenox subcut    Plan d/c d/w pt, in agreement.

## 2021-10-13 NOTE — PROGRESS NOTE ADULT - SUBJECTIVE AND OBJECTIVE BOX
Patient: DIEGO FUNG [id number] 31y Male                            Hospitalist Attending Note    No complaints.    Seen with  [id number].      ____________________PHYSICAL EXAM:  GENERAL:  NAD Alert and Oriented x 3   HEENT: NCAT  CARDIOVASCULAR:  S1, S2  LUNGS: CTAB  ABDOMEN:  soft, (-) tenderness, (-) distension, (+) bowel sounds, (-) guarding, (-) rebound (-) rigidity  EXTREMITIES:  no cyanosis / clubbing / edema.   ____________________     VITALS:  Vital Signs Last 24 Hrs  T(C): 37.3 (13 Oct 2021 12:00), Max: 37.3 (13 Oct 2021 12:00)  T(F): 99.2 (13 Oct 2021 12:00), Max: 99.2 (13 Oct 2021 12:00)  HR: 118 (13 Oct 2021 12:00) (100 - 118)  BP: 121/91 (13 Oct 2021 12:00) (117/83 - 126/84)  BP(mean): --  RR: 16 (13 Oct 2021 12:00) (16 - 18)  SpO2: 100% (13 Oct 2021 12:00) (99% - 100%) Daily     Daily   CAPILLARY BLOOD GLUCOSE      POCT Blood Glucose.: 230 mg/dL (13 Oct 2021 16:16)  POCT Blood Glucose.: 239 mg/dL (13 Oct 2021 11:19)  POCT Blood Glucose.: 274 mg/dL (13 Oct 2021 07:57)  POCT Blood Glucose.: 232 mg/dL (12 Oct 2021 21:37)  POCT Blood Glucose.: 311 mg/dL (12 Oct 2021 16:51)    I&O's Summary    12 Oct 2021 07:01  -  13 Oct 2021 07:00  --------------------------------------------------------  IN: 700 mL / OUT: 0 mL / NET: 700 mL        HISTORY:  PAST MEDICAL & SURGICAL HISTORY:  Allergies    No Known Allergies    Intolerances       LABS:                        12.1   4.29  )-----------( 193      ( 13 Oct 2021 07:59 )             34.3       Culture - Sputum (collected 12 Oct 2021 15:05)  Source: .Sputum Sputum  Gram Stain (12 Oct 2021 20:31):    Few polymorphonuclear leukocytes per low power field    Few Squamous epithelial cells per low power field    Few Gram Negative Rods per oil power field    Few Gram Positive Rods per oil power field    Few Gram positive cocci in pairs per oil power field    Culture - Blood (collected 10 Oct 2021 16:54)  Source: .Blood Blood  Preliminary Report (11 Oct 2021 17:02):    No growth to date.    Culture - Blood (collected 10 Oct 2021 16:54)  Source: .Blood Blood  Preliminary Report (11 Oct 2021 17:02):    No growth to date.    10-13    134<L>  |  99  |  5<L>  ----------------------------<  255<H>  3.7   |  26  |  0.58    Ca    8.8      13 Oct 2021 07:59  Phos  3.1     10-13  Mg     1.7     10-13    TPro  6.7  /  Alb  3.1<L>  /  TBili  0.8  /  DBili  x   /  AST  422<H>  /  ALT  455<H>  /  AlkPhos  105  10-13      LIVER FUNCTIONS - ( 13 Oct 2021 07:59 )  Alb: 3.1 g/dL / Pro: 6.7 gm/dL / ALK PHOS: 105 U/L / ALT: 455 U/L / AST: 422 U/L / GGT: x                 Culture - Sputum (collected 12 Oct 2021 15:05)  Source: .Sputum Sputum  Gram Stain (12 Oct 2021 20:31):    Few polymorphonuclear leukocytes per low power field    Few Squamous epithelial cells per low power field    Few Gram Negative Rods per oil power field    Few Gram Positive Rods per oil power field    Few Gram positive cocci in pairs per oil power field    Culture - Blood (collected 10 Oct 2021 16:54)  Source: .Blood Blood  Preliminary Report (11 Oct 2021 17:02):    No growth to date.    Culture - Blood (collected 10 Oct 2021 16:54)  Source: .Blood Blood  Preliminary Report (11 Oct 2021 17:02):    No growth to date.          MEDICATIONS:  MEDICATIONS  (STANDING):  chlorhexidine 2% Cloths 1 Application(s) Topical <User Schedule>  dextrose 40% Gel 15 Gram(s) Oral once  dextrose 5%. 1000 milliLiter(s) (50 mL/Hr) IV Continuous <Continuous>  dextrose 5%. 1000 milliLiter(s) (100 mL/Hr) IV Continuous <Continuous>  dextrose 50% Injectable 25 Gram(s) IV Push once  dextrose 50% Injectable 12.5 Gram(s) IV Push once  dextrose 50% Injectable 25 Gram(s) IV Push once  enoxaparin Injectable 40 milliGRAM(s) SubCutaneous daily  glucagon  Injectable 1 milliGRAM(s) IntraMuscular once  glucagon  Injectable 1 milliGRAM(s) IntraMuscular once  influenza   Vaccine 0.5 milliLiter(s) IntraMuscular once  insulin glargine Injectable (LANTUS) 21 Unit(s) SubCutaneous at bedtime  insulin lispro (ADMELOG) corrective regimen sliding scale   SubCutaneous three times a day before meals  insulin lispro (ADMELOG) corrective regimen sliding scale   SubCutaneous at bedtime  insulin lispro Injectable (ADMELOG) 7 Unit(s) SubCutaneous three times a day before meals  metFORMIN 500 milliGRAM(s) Oral two times a day  pantoprazole    Tablet 40 milliGRAM(s) Oral before breakfast  sodium chloride 0.9% 1000 milliLiter(s) (100 mL/Hr) IV Continuous <Continuous>    MEDICATIONS  (PRN):

## 2021-10-14 LAB
ALBUMIN SERPL ELPH-MCNC: 3.1 G/DL — LOW (ref 3.3–5)
ALP SERPL-CCNC: 100 U/L — SIGNIFICANT CHANGE UP (ref 40–120)
ALT FLD-CCNC: 492 U/L — HIGH (ref 12–78)
ANION GAP SERPL CALC-SCNC: 6 MMOL/L — SIGNIFICANT CHANGE UP (ref 5–17)
AST SERPL-CCNC: 326 U/L — HIGH (ref 15–37)
BILIRUB DIRECT SERPL-MCNC: 0.12 MG/DL — SIGNIFICANT CHANGE UP (ref 0.05–0.2)
BILIRUB INDIRECT FLD-MCNC: 0.4 MG/DL — SIGNIFICANT CHANGE UP (ref 0.2–1)
BILIRUB SERPL-MCNC: 0.5 MG/DL — SIGNIFICANT CHANGE UP (ref 0.2–1.2)
BUN SERPL-MCNC: 11 MG/DL — SIGNIFICANT CHANGE UP (ref 7–23)
CALCIUM SERPL-MCNC: 8.9 MG/DL — SIGNIFICANT CHANGE UP (ref 8.5–10.1)
CHLORIDE SERPL-SCNC: 96 MMOL/L — SIGNIFICANT CHANGE UP (ref 96–108)
CO2 SERPL-SCNC: 30 MMOL/L — SIGNIFICANT CHANGE UP (ref 22–31)
CREAT SERPL-MCNC: 0.51 MG/DL — SIGNIFICANT CHANGE UP (ref 0.5–1.3)
CULTURE RESULTS: SIGNIFICANT CHANGE UP
GLUCOSE BLDC GLUCOMTR-MCNC: 126 MG/DL — HIGH (ref 70–99)
GLUCOSE BLDC GLUCOMTR-MCNC: 198 MG/DL — HIGH (ref 70–99)
GLUCOSE BLDC GLUCOMTR-MCNC: 233 MG/DL — HIGH (ref 70–99)
GLUCOSE BLDC GLUCOMTR-MCNC: 269 MG/DL — HIGH (ref 70–99)
GLUCOSE SERPL-MCNC: 230 MG/DL — HIGH (ref 70–99)
GRAM STN FLD: SIGNIFICANT CHANGE UP
HCT VFR BLD CALC: 33.5 % — LOW (ref 39–50)
HGB BLD-MCNC: 11.7 G/DL — LOW (ref 13–17)
MCHC RBC-ENTMCNC: 31 PG — SIGNIFICANT CHANGE UP (ref 27–34)
MCHC RBC-ENTMCNC: 34.9 GM/DL — SIGNIFICANT CHANGE UP (ref 32–36)
MCV RBC AUTO: 88.6 FL — SIGNIFICANT CHANGE UP (ref 80–100)
NRBC # BLD: 0 /100 WBCS — SIGNIFICANT CHANGE UP (ref 0–0)
PLATELET # BLD AUTO: 222 K/UL — SIGNIFICANT CHANGE UP (ref 150–400)
POTASSIUM SERPL-MCNC: 3.3 MMOL/L — LOW (ref 3.5–5.3)
POTASSIUM SERPL-SCNC: 3.3 MMOL/L — LOW (ref 3.5–5.3)
PROT SERPL-MCNC: 6.5 GM/DL — SIGNIFICANT CHANGE UP (ref 6–8.3)
RBC # BLD: 3.78 M/UL — LOW (ref 4.2–5.8)
RBC # FLD: 12.9 % — SIGNIFICANT CHANGE UP (ref 10.3–14.5)
SODIUM SERPL-SCNC: 132 MMOL/L — LOW (ref 135–145)
SPECIMEN SOURCE: SIGNIFICANT CHANGE UP
WBC # BLD: 4.06 K/UL — SIGNIFICANT CHANGE UP (ref 3.8–10.5)
WBC # FLD AUTO: 4.06 K/UL — SIGNIFICANT CHANGE UP (ref 3.8–10.5)

## 2021-10-14 PROCEDURE — 99232 SBSQ HOSP IP/OBS MODERATE 35: CPT

## 2021-10-14 RX ORDER — POTASSIUM CHLORIDE 20 MEQ
40 PACKET (EA) ORAL ONCE
Refills: 0 | Status: COMPLETED | OUTPATIENT
Start: 2021-10-14 | End: 2021-10-14

## 2021-10-14 RX ORDER — METOPROLOL TARTRATE 50 MG
25 TABLET ORAL
Refills: 0 | Status: DISCONTINUED | OUTPATIENT
Start: 2021-10-14 | End: 2021-10-14

## 2021-10-14 RX ORDER — SODIUM CHLORIDE 9 MG/ML
1000 INJECTION INTRAMUSCULAR; INTRAVENOUS; SUBCUTANEOUS
Refills: 0 | Status: DISCONTINUED | OUTPATIENT
Start: 2021-10-14 | End: 2021-10-16

## 2021-10-14 RX ORDER — METOPROLOL TARTRATE 50 MG
25 TABLET ORAL EVERY 12 HOURS
Refills: 0 | Status: DISCONTINUED | OUTPATIENT
Start: 2021-10-14 | End: 2021-10-15

## 2021-10-14 RX ADMIN — SODIUM CHLORIDE 100 MILLILITER(S): 9 INJECTION INTRAMUSCULAR; INTRAVENOUS; SUBCUTANEOUS at 17:40

## 2021-10-14 RX ADMIN — Medication 7 UNIT(S): at 08:26

## 2021-10-14 RX ADMIN — METFORMIN HYDROCHLORIDE 500 MILLIGRAM(S): 850 TABLET ORAL at 17:39

## 2021-10-14 RX ADMIN — INSULIN GLARGINE 21 UNIT(S): 100 INJECTION, SOLUTION SUBCUTANEOUS at 21:35

## 2021-10-14 RX ADMIN — Medication 7 UNIT(S): at 11:57

## 2021-10-14 RX ADMIN — Medication 1: at 21:35

## 2021-10-14 RX ADMIN — PANTOPRAZOLE SODIUM 40 MILLIGRAM(S): 20 TABLET, DELAYED RELEASE ORAL at 08:27

## 2021-10-14 RX ADMIN — Medication 4: at 08:26

## 2021-10-14 RX ADMIN — METFORMIN HYDROCHLORIDE 500 MILLIGRAM(S): 850 TABLET ORAL at 08:35

## 2021-10-14 RX ADMIN — Medication 40 MILLIEQUIVALENT(S): at 21:35

## 2021-10-14 RX ADMIN — Medication 25 MILLIGRAM(S): at 15:52

## 2021-10-14 RX ADMIN — Medication 2: at 11:57

## 2021-10-14 RX ADMIN — ENOXAPARIN SODIUM 40 MILLIGRAM(S): 100 INJECTION SUBCUTANEOUS at 11:56

## 2021-10-14 NOTE — PROGRESS NOTE ADULT - SUBJECTIVE AND OBJECTIVE BOX
Patient is a 31y old  Male who presents with a chief complaint of DKA (14 Oct 2021 16:03)      Interval History: on Lantus 21 units and prandial lispro 7 units and Metformin BID   Finger-sticks are in high 100's and low 200's     MEDICATIONS  (STANDING):  chlorhexidine 2% Cloths 1 Application(s) Topical <User Schedule>  dextrose 40% Gel 15 Gram(s) Oral once  dextrose 5%. 1000 milliLiter(s) (50 mL/Hr) IV Continuous <Continuous>  dextrose 5%. 1000 milliLiter(s) (100 mL/Hr) IV Continuous <Continuous>  dextrose 50% Injectable 25 Gram(s) IV Push once  dextrose 50% Injectable 12.5 Gram(s) IV Push once  dextrose 50% Injectable 25 Gram(s) IV Push once  enoxaparin Injectable 40 milliGRAM(s) SubCutaneous daily  glucagon  Injectable 1 milliGRAM(s) IntraMuscular once  glucagon  Injectable 1 milliGRAM(s) IntraMuscular once  influenza   Vaccine 0.5 milliLiter(s) IntraMuscular once  insulin glargine Injectable (LANTUS) 21 Unit(s) SubCutaneous at bedtime  insulin lispro (ADMELOG) corrective regimen sliding scale   SubCutaneous three times a day before meals  insulin lispro (ADMELOG) corrective regimen sliding scale   SubCutaneous at bedtime  insulin lispro Injectable (ADMELOG) 7 Unit(s) SubCutaneous three times a day before meals  metFORMIN 500 milliGRAM(s) Oral two times a day  metoprolol tartrate 25 milliGRAM(s) Oral every 12 hours  pantoprazole    Tablet 40 milliGRAM(s) Oral before breakfast  sodium chloride 0.9%. 1000 milliLiter(s) (100 mL/Hr) IV Continuous <Continuous>    MEDICATIONS  (PRN):      Allergies    No Known Allergies    Intolerances        REVIEW OF SYSTEMS:  CONSTITUTIONAL: no changes  EYES: No eye pain, visual disturbances, or discharge  ENMT:  No difficulty hearing, No sinus or throat pain  NECK: No pain or stiffness  RESPIRATORY: No cough, wheezing, chills or hemoptysis; No shortness of breath  CARDIOVASCULAR: No chest pain, palpitations or leg swelling  GASTROINTESTINAL: No abdominal or epigastric pain. No nausea, vomiting, or hematemesis; No diarrhea or constipation. No melena or hematochezia.  GENITOURINARY: No dysuria, frequency, hematuria, or incontinence  NEUROLOGICAL: No headaches, memory loss, loss of strength, numbness, or tremors  SKIN: No itching, burning, rashes, or lesions   ENDOCRINE: No heat or cold intolerance; No hair loss  MUSCULOSKELETAL: No joint pain or swelling; No muscle, back, or extremity pain  PSYCHIATRIC: No depression, anxiety, mood swings, or difficulty sleeping  HEME/LYMPH: No easy bruising, or bleeding gums  ALLERY AND IMMUNOLOGIC: No hives or eczema    Vital Signs Last 24 Hrs  T(C): 36.8 (14 Oct 2021 17:00), Max: 37 (14 Oct 2021 12:08)  T(F): 98.3 (14 Oct 2021 17:00), Max: 98.6 (14 Oct 2021 12:08)  HR: 105 (14 Oct 2021 21:30) (105 - 132)  BP: 112/78 (14 Oct 2021 17:00) (112/78 - 120/83)  BP(mean): --  RR: 18 (14 Oct 2021 21:30) (16 - 18)  SpO2: 99% (14 Oct 2021 21:30) (99% - 100%)    PHYSICAL EXAM:  GENERAL:   HEAD: Atraumatic, Normocephalic  EYES: PERRLA, conjunctiva and sclera clear  ENMT: No  exudates,; Moist mucous membranes,, No lesions  NECK: Supple, No JVD, Normal thyroid  NERVOUS SYSTEM:  Alert & Oriented,   CHEST/LUNG: Clear to auscultation bilaterally; No rales, rhonchi, wheezing, or rubs  HEART: Regular rate and rhythm; No murmurs, rubs, or gallops  ABDOMEN: Soft, Nontender, Nondistended; Bowel sounds present  EXTREMITIES:  2+ Peripheral Pulses, no edema  SKIN: No rashes or lesions    LABS:        CAPILLARY BLOOD GLUCOSE      POCT Blood Glucose.: 269 mg/dL (14 Oct 2021 21:14)  POCT Blood Glucose.: 126 mg/dL (14 Oct 2021 15:57)  POCT Blood Glucose.: 198 mg/dL (14 Oct 2021 11:28)  POCT Blood Glucose.: 233 mg/dL (14 Oct 2021 07:59)    Lipid panel:           Thyroid:  Diabetes Tests:  Parathyroid Panel:  Adrenals:  RADIOLOGY & ADDITIONAL TESTS:    Imaging Personally Reviewed:  [ ] YES  [ ] NO    Consultant(s) Notes Reviewed:  [ ] YES  [ ] NO    Care Discussed with Consultants/Other Providers [ ] YES  [ ] NO

## 2021-10-14 NOTE — PROGRESS NOTE ADULT - ASSESSMENT
32yo Yi speaking male with insulin dependent DM (reports taking insulin 70/30 : 10 units PM and 30 units AM) presents from home after being found on ground in basement with AMS, admitted for DKA.    # DKA, uncontrolled DM - Ha1c 9.0.  Continue recommendations by Endocrine.  As pt uninsured, expect NPH / insulin coverage.  Counselled pt on diet modification, medication compliance and followup.    # Acute Metabolic Encephalopathy - due to above, now resolved.   # Diarrhea - Had no episodes yesterday.  Discussed need for outpatient workup with GI.  Will check stool PCR.    # Tachycardia - EKG suggests sinus tachycardia.  TTE to assess EF.    # R pulmonary nodule - 7mm incidentally noted on CT chest.  Outpatient followup.   # Suspected GERD - trial of PPI.   # Hypothermia  - no S/s of active infection.  S/p Rocephin.  CT chest not suggestive of pneumonia.  UA, BCx unremarkable.  WBC, temp wnl.  Observe off antibiotics.  # Elevated LFTs - CT A/P was unremarkable.  Repeat LFTs in am, has been stable.  Hepatitis profile.    # Inpatient DVT Prophylaxis - Lovenox subcut    Plan d/c in am d/w pt, in agreement.

## 2021-10-14 NOTE — PROGRESS NOTE ADULT - SUBJECTIVE AND OBJECTIVE BOX
Patient: DIEGO FUNG [id number] 31y Male                            Hospitalist Attending Note      Seen with  [id number].  Now c/o several loose BMs, not present yesterday.  States he has symptoms on / off since prior to hospitalization.  No Abdominal pain, nausea, vomiting.  Taking po intake.  No additional complaints.   Tachycardia noted.  No chest pain / palpitations / SOB.        ____________________PHYSICAL EXAM:  GENERAL:  NAD Alert and Oriented x 3   HEENT: NCAT  CARDIOVASCULAR:  S1, S2  LUNGS: CTAB  ABDOMEN:  soft, (-) tenderness, (-) distension, (+) bowel sounds, (-) guarding, (-) rebound (-) rigidity  EXTREMITIES:  no cyanosis / clubbing / edema.   ____________________    VITALS:  Vital Signs Last 24 Hrs  T(C): 37 (14 Oct 2021 12:08), Max: 37.1 (13 Oct 2021 17:00)  T(F): 98.6 (14 Oct 2021 12:08), Max: 98.7 (13 Oct 2021 17:00)  HR: 132 (14 Oct 2021 12:08) (105 - 132)  BP: 114/75 (14 Oct 2021 12:08) (109/71 - 120/83)  BP(mean): --  RR: 16 (14 Oct 2021 12:08) (16 - 18)  SpO2: 99% (14 Oct 2021 12:08) (99% - 100%) Daily     Daily   CAPILLARY BLOOD GLUCOSE      POCT Blood Glucose.: 126 mg/dL (14 Oct 2021 15:57)  POCT Blood Glucose.: 198 mg/dL (14 Oct 2021 11:28)  POCT Blood Glucose.: 233 mg/dL (14 Oct 2021 07:59)  POCT Blood Glucose.: 155 mg/dL (13 Oct 2021 21:16)  POCT Blood Glucose.: 230 mg/dL (13 Oct 2021 16:16)    I&O's Summary    13 Oct 2021 07:01  -  14 Oct 2021 07:00  --------------------------------------------------------  IN: 280 mL / OUT: 0 mL / NET: 280 mL        LABS:                        11.7   4.06  )-----------( 222      ( 14 Oct 2021 09:41 )             33.5     10-14    132<L>  |  96  |  11  ----------------------------<  230<H>  3.3<L>   |  30  |  0.51    Ca    8.9      14 Oct 2021 09:41  Phos  3.1     10-13  Mg     1.7     10-13    TPro  6.5  /  Alb  3.1<L>  /  TBili  0.5  /  DBili  .12  /  AST  326<H>  /  ALT  492<H>  /  AlkPhos  100  10-14      LIVER FUNCTIONS - ( 14 Oct 2021 09:41 )  Alb: 3.1 g/dL / Pro: 6.5 gm/dL / ALK PHOS: 100 U/L / ALT: 492 U/L / AST: 326 U/L / GGT: x                   Culture - Sputum (collected 12 Oct 2021 15:05)  Source: .Sputum Sputum  Gram Stain (prelim) (13 Oct 2021 22:15):    Few polymorphonuclear leukocytes per low power field    Few Squamous epithelial cells per low power field    Few Gram Negative Rods per oil power field    Few Gram Positive Rods per oil power field    Few Gram positive cocci in pairs per oil power field  Preliminary Report (13 Oct 2021 22:15):    Normal Respiratory [place] present        MEDICATIONS:  chlorhexidine 2% Cloths 1 Application(s) Topical <User Schedule>  dextrose 40% Gel 15 Gram(s) Oral once  dextrose 5%. 1000 milliLiter(s) IV Continuous <Continuous>  dextrose 5%. 1000 milliLiter(s) IV Continuous <Continuous>  dextrose 50% Injectable 25 Gram(s) IV Push once  dextrose 50% Injectable 12.5 Gram(s) IV Push once  dextrose 50% Injectable 25 Gram(s) IV Push once  enoxaparin Injectable 40 milliGRAM(s) SubCutaneous daily  glucagon  Injectable 1 milliGRAM(s) IntraMuscular once  glucagon  Injectable 1 milliGRAM(s) IntraMuscular once  influenza   Vaccine 0.5 milliLiter(s) IntraMuscular once  insulin glargine Injectable (LANTUS) 21 Unit(s) SubCutaneous at bedtime  insulin lispro (ADMELOG) corrective regimen sliding scale   SubCutaneous three times a day before meals  insulin lispro (ADMELOG) corrective regimen sliding scale   SubCutaneous at bedtime  insulin lispro Injectable (ADMELOG) 7 Unit(s) SubCutaneous three times a day before meals  metFORMIN 500 milliGRAM(s) Oral two times a day  metoprolol tartrate 25 milliGRAM(s) Oral every 12 hours  pantoprazole    Tablet 40 milliGRAM(s) Oral before breakfast  sodium chloride 0.9%. 1000 milliLiter(s) IV Continuous <Continuous>

## 2021-10-15 LAB
ALBUMIN SERPL ELPH-MCNC: 2.9 G/DL — LOW (ref 3.3–5)
ALP SERPL-CCNC: 102 U/L — SIGNIFICANT CHANGE UP (ref 40–120)
ALT FLD-CCNC: 490 U/L — HIGH (ref 12–78)
AST SERPL-CCNC: 282 U/L — HIGH (ref 15–37)
BILIRUB DIRECT SERPL-MCNC: 0.11 MG/DL — SIGNIFICANT CHANGE UP (ref 0.05–0.2)
BILIRUB INDIRECT FLD-MCNC: 0.1 MG/DL — LOW (ref 0.2–1)
BILIRUB SERPL-MCNC: 0.2 MG/DL — SIGNIFICANT CHANGE UP (ref 0.2–1.2)
CULTURE RESULTS: SIGNIFICANT CHANGE UP
GLUCOSE BLDC GLUCOMTR-MCNC: 177 MG/DL — HIGH (ref 70–99)
GLUCOSE BLDC GLUCOMTR-MCNC: 198 MG/DL — HIGH (ref 70–99)
GLUCOSE BLDC GLUCOMTR-MCNC: 200 MG/DL — HIGH (ref 70–99)
GLUCOSE BLDC GLUCOMTR-MCNC: 211 MG/DL — HIGH (ref 70–99)
PROT SERPL-MCNC: 6.1 GM/DL — SIGNIFICANT CHANGE UP (ref 6–8.3)
SPECIMEN SOURCE: SIGNIFICANT CHANGE UP
TSH SERPL-MCNC: 1.37 UIU/ML — SIGNIFICANT CHANGE UP (ref 0.36–3.74)

## 2021-10-15 PROCEDURE — 93306 TTE W/DOPPLER COMPLETE: CPT | Mod: 26

## 2021-10-15 PROCEDURE — 93010 ELECTROCARDIOGRAM REPORT: CPT

## 2021-10-15 PROCEDURE — 99232 SBSQ HOSP IP/OBS MODERATE 35: CPT

## 2021-10-15 RX ORDER — METFORMIN HYDROCHLORIDE 850 MG/1
1 TABLET ORAL
Qty: 60 | Refills: 0
Start: 2021-10-15

## 2021-10-15 RX ORDER — PANTOPRAZOLE SODIUM 20 MG/1
1 TABLET, DELAYED RELEASE ORAL
Qty: 30 | Refills: 0
Start: 2021-10-15 | End: 2021-11-13

## 2021-10-15 RX ORDER — INSULIN NPH HUM/REG INSULIN HM 70-30/ML
20 VIAL (ML) SUBCUTANEOUS
Qty: 1 | Refills: 0
Start: 2021-10-15

## 2021-10-15 RX ORDER — METOPROLOL TARTRATE 50 MG
12.5 TABLET ORAL ONCE
Refills: 0 | Status: DISCONTINUED | OUTPATIENT
Start: 2021-10-15 | End: 2021-10-15

## 2021-10-15 RX ORDER — METOPROLOL TARTRATE 50 MG
50 TABLET ORAL EVERY 8 HOURS
Refills: 0 | Status: DISCONTINUED | OUTPATIENT
Start: 2021-10-15 | End: 2021-10-16

## 2021-10-15 RX ORDER — METOPROLOL TARTRATE 50 MG
1 TABLET ORAL
Qty: 90 | Refills: 0
Start: 2021-10-15

## 2021-10-15 RX ADMIN — Medication 2: at 11:40

## 2021-10-15 RX ADMIN — Medication 25 MILLIGRAM(S): at 05:26

## 2021-10-15 RX ADMIN — METFORMIN HYDROCHLORIDE 500 MILLIGRAM(S): 850 TABLET ORAL at 17:25

## 2021-10-15 RX ADMIN — INSULIN GLARGINE 21 UNIT(S): 100 INJECTION, SOLUTION SUBCUTANEOUS at 21:41

## 2021-10-15 RX ADMIN — METFORMIN HYDROCHLORIDE 500 MILLIGRAM(S): 850 TABLET ORAL at 07:59

## 2021-10-15 RX ADMIN — Medication 7 UNIT(S): at 08:00

## 2021-10-15 RX ADMIN — SODIUM CHLORIDE 100 MILLILITER(S): 9 INJECTION INTRAMUSCULAR; INTRAVENOUS; SUBCUTANEOUS at 05:26

## 2021-10-15 RX ADMIN — PANTOPRAZOLE SODIUM 40 MILLIGRAM(S): 20 TABLET, DELAYED RELEASE ORAL at 05:26

## 2021-10-15 RX ADMIN — Medication 2: at 07:59

## 2021-10-15 RX ADMIN — SODIUM CHLORIDE 100 MILLILITER(S): 9 INJECTION INTRAMUSCULAR; INTRAVENOUS; SUBCUTANEOUS at 16:00

## 2021-10-15 RX ADMIN — ENOXAPARIN SODIUM 40 MILLIGRAM(S): 100 INJECTION SUBCUTANEOUS at 11:39

## 2021-10-15 RX ADMIN — Medication 7 UNIT(S): at 17:05

## 2021-10-15 RX ADMIN — Medication 7 UNIT(S): at 11:40

## 2021-10-15 RX ADMIN — Medication 50 MILLIGRAM(S): at 21:40

## 2021-10-15 RX ADMIN — Medication 4: at 17:05

## 2021-10-15 RX ADMIN — Medication 50 MILLIGRAM(S): at 17:25

## 2021-10-15 NOTE — DISCHARGE NOTE PROVIDER - NSDCMRMEDTOKEN_GEN_ALL_CORE_FT
.: Flexpen Needles  metFORMIN 500 mg oral tablet: 1 tab(s) orally 2 times a day  metoprolol tartrate 50 mg oral tablet: 1 tab(s) orally every 8 hours  NovoLIN 70/30 FlexPen subcutaneous suspension: 20 unit(s) subcutaneous 2 times a day   pantoprazole 40 mg oral delayed release tablet: 1 tab(s) orally once a day

## 2021-10-15 NOTE — DISCHARGE NOTE PROVIDER - HOSPITAL COURSE
32yo Czech speaking male with insulin dependent DM (reports taking insulin 70/30 : 10 units PM and 30 units AM) presents from home after being found on ground in basement with AMS, admitted for DKA.    # DKA, uncontrolled DM - Ha1c 9.0.  Continue recommendations by Endocrine.  As pt uninsured, expect NPH / insulin coverage.  Counselled pt on diet modification, medication compliance and followup.    # Acute Metabolic Encephalopathy - due to above, now resolved.   # Diarrhea - Had no episodes yesterday.  Discussed need for outpatient workup with GI.  Will check stool PCR.    # Tachycardia - EKG suggests sinus tachycardia.  No s/s to suggest PE / Pheochromocytoma.  Rate now better controlled on trial of BBlocker.  Emphasized outpatient f/u.     # R pulmonary nodule - 7mm incidentally noted on CT chest.  Outpatient followup.   # Suspected GERD - trial of PPI.   # Hypothermia  - no S/s of active infection.  S/p Rocephin.  CT chest not suggestive of pneumonia.  UA, BCx unremarkable.  WBC, temp wnl.  Observe off antibiotics.  # Elevated LFTs - CT A/P was unremarkable.  Repeat LFTs has been stable.    # Inpatient DVT Prophylaxis - Lovenox subcut    Stable for d/c home pending f/u labs.   States he feels at baseline.  Disposition: Stable for discharge.  Outpatient followup discussed.  Total time spent on discharge is  45 minutes.   30yo Bulgarian speaking male with insulin dependent DM (reports taking insulin 70/30 : 10 units PM and 30 units AM) presents from home after being found on ground in basement with AMS, admitted for DKA.  Found to have elevated LFTs.  Acute hepatitis profile and CT were unremarkable.  Also noted to have sinus tachycardia.  TTE unremarkable, TSH wnl.  BP stable.  Counselled on outpatient followup.  Also discussed R pulmonary nodule.  Emphasized need to establish regular medical care with PMD, endocrine.      # DKA, uncontrolled DM - Ha1c 9.0.  Continue recommendations by Endocrine.  As pt uninsured, expect NPH / insulin coverage.  Counselled pt on diet modification, medication compliance and followup.    # Acute Metabolic Encephalopathy - due to above, now resolved.   # Diarrhea - Had no episodes yesterday.  Discussed need for outpatient workup with GI.  Will check stool PCR.    # Tachycardia - EKG suggests sinus tachycardia.  No s/s to suggest PE / Pheochromocytoma.  Rate now better controlled on trial of BBlocker.  Emphasized outpatient f/u.     # R pulmonary nodule - 7mm incidentally noted on CT chest.  Outpatient followup.   # Suspected GERD - trial of PPI.   # Hypothermia  - no S/s of active infection.  S/p Rocephin.  CT chest not suggestive of pneumonia.  UA, BCx unremarkable.  WBC, temp wnl.  Observe off antibiotics.  # Elevated LFTs - CT A/P was unremarkable.  Repeat LFTs has been stable.    # Inpatient DVT Prophylaxis - Lovenox subcut    Stable for d/c home         Disposition: Stable for discharge.  Outpatient followup discussed.  Total time spent on discharge is  45 minutes.

## 2021-10-15 NOTE — CHART NOTE - NSCHARTNOTEFT_GEN_A_CORE
Nutrition follow-up note:    Pt adm w/DKA. PMHx includes insulin dependent DM. Per chart Acute Metabolic Encephalopathy-now resolved. Pt noted w/improved appetite & PO intake, observed lunch tray at bedside, pt consumed >80% meal. Noted Glucerna shake bottle- empty at bedside. No N/V reported, now resolved. Pt had episodes diarrhea/loose stools on (10/13), none since. Endocrinology following w/insulin recs; pt also started on Metformin.     Factors impacting intake: [x ] none [ ] nausea  [ ] vomiting [ ] diarrhea [ ] constipation  [ ]chewing problems [ ] swallowing issues  [ ] other:     Diet Prescription: Diet, Consistent Carbohydrate w/Evening Snack:   Supplement Feeding Modality:  Oral  Glucerna Shake Cans or Servings Per Day:  1       Frequency:  Three Times a day (10-12-21 @ 15:30)    Intake: >75% intake of meals & PO supplement.    Current Weight: no new weights since 10/11. (10/11) 58.5 kg, (10/10) 58.1 kg.  % Weight Change: unable to assess as no new weights available.  No edema documented.    Pertinent Medications: MEDICATIONS  (STANDING):  chlorhexidine 2% Cloths 1 Application(s) Topical <User Schedule>  dextrose 40% Gel 15 Gram(s) Oral once  dextrose 5%. 1000 milliLiter(s) (50 mL/Hr) IV Continuous <Continuous>  dextrose 5%. 1000 milliLiter(s) (100 mL/Hr) IV Continuous <Continuous>  dextrose 50% Injectable 25 Gram(s) IV Push once  dextrose 50% Injectable 12.5 Gram(s) IV Push once  dextrose 50% Injectable 25 Gram(s) IV Push once  enoxaparin Injectable 40 milliGRAM(s) SubCutaneous daily  glucagon  Injectable 1 milliGRAM(s) IntraMuscular once  glucagon  Injectable 1 milliGRAM(s) IntraMuscular once  influenza   Vaccine 0.5 milliLiter(s) IntraMuscular once  insulin glargine Injectable (LANTUS) 21 Unit(s) SubCutaneous at bedtime  insulin lispro (ADMELOG) corrective regimen sliding scale   SubCutaneous three times a day before meals  insulin lispro (ADMELOG) corrective regimen sliding scale   SubCutaneous at bedtime  insulin lispro Injectable (ADMELOG) 7 Unit(s) SubCutaneous three times a day before meals  metFORMIN 500 milliGRAM(s) Oral two times a day  metoprolol tartrate 25 milliGRAM(s) Oral every 12 hours  metoprolol tartrate 12.5 milliGRAM(s) Oral once  pantoprazole    Tablet 40 milliGRAM(s) Oral before breakfast  sodium chloride 0.9%. 1000 milliLiter(s) (100 mL/Hr) IV Continuous <Continuous>    MEDICATIONS  (PRN):    Pertinent Labs: 10-14 Na132 mmol/L<L> Glu 230 mg/dL<H> K+ 3.3 mmol/L<L> Cr  0.51 mg/dL BUN 11 mg/dL 10-13 Phos 3.1 mg/dL 10-15 Alb 2.9 g/dL<L>    10-10-21 @ 23:27A1C 9.0  POCT Glucose (10/15) 200, (10/14) 269, 126, 198, 233.    Skin: no pressure ulcers.    Estimated Needs:   [x ] no change since previous assessment (10/12)  [ ] recalculated:     Previous Nutrition Diagnosis:   Malnutrition Severe Malnutrition in context of acute illness.     Etiology Inadequate energy/protein intake related to N/V/D issues, DKA.     Signs/Symptoms 11% wt loss x 1 month; < 50% nutrition needs > 5 days; Moderate muscle wasting/fat depletion.     Goal/Expected Outcome Pt to consume > 75% of meals/supplements (GOAL being met).    Nutrition Diagnosis is [x ] ongoing  [ ] resolved [ ] not applicable     New Nutrition Diagnosis: [x ] not applicable      Interventions:   Recommend:  [X] Continue current diet as Rx'd.  [ ] Change Diet To:  [x ] Nutrition Supplement: Continue current PO supplement.  [ ] Nutrition Support  [ ] Other:     Monitoring and Evaluation:   [x ] PO intake [ x ] Tolerance to diet prescription [ x ] weights [ x ] labs[ x ] follow up per protocol  [ ] other:

## 2021-10-15 NOTE — DISCHARGE NOTE PROVIDER - NSDCFUADDINST_GEN_ALL_CORE_FT
It is important to see your primary physician as well as the physicians noted below within the next week to perform a comprehensive medical review.  Call their offices for an appointment as soon as you leave the hospital.  You will also need to see them for renewal of your medications.  If you do not have a primary physician, contact the [facility] Physician Referral Service at [phone].  To obtain your results, you can access the [employer] Patient Portal at [email or web address].  Your medical issues appear to be stable at this time, but if your symptoms recur or worsen, contact your physicians and/or return to the hospital if necessary.  If you encounter any issues or questions with your medication, call your physicians before stopping the medication.  Do not drive.  Limit your diet to 2 grams of sodium daily.

## 2021-10-15 NOTE — PROGRESS NOTE ADULT - SUBJECTIVE AND OBJECTIVE BOX
Patient is a 31y old  Male who presents with a chief complaint of DKA (15 Oct 2021 16:06)      Interval History: Finger-sticks are in high 100's and low 200's   decreasing glucose toxicity   on Lantus 21 units and prandial lispro 7 units and Metformin BID low dose     MEDICATIONS  (STANDING):  chlorhexidine 2% Cloths 1 Application(s) Topical <User Schedule>  dextrose 40% Gel 15 Gram(s) Oral once  dextrose 5%. 1000 milliLiter(s) (50 mL/Hr) IV Continuous <Continuous>  dextrose 5%. 1000 milliLiter(s) (100 mL/Hr) IV Continuous <Continuous>  dextrose 50% Injectable 25 Gram(s) IV Push once  dextrose 50% Injectable 12.5 Gram(s) IV Push once  dextrose 50% Injectable 25 Gram(s) IV Push once  enoxaparin Injectable 40 milliGRAM(s) SubCutaneous daily  glucagon  Injectable 1 milliGRAM(s) IntraMuscular once  glucagon  Injectable 1 milliGRAM(s) IntraMuscular once  influenza   Vaccine 0.5 milliLiter(s) IntraMuscular once  insulin glargine Injectable (LANTUS) 21 Unit(s) SubCutaneous at bedtime  insulin lispro (ADMELOG) corrective regimen sliding scale   SubCutaneous three times a day before meals  insulin lispro (ADMELOG) corrective regimen sliding scale   SubCutaneous at bedtime  insulin lispro Injectable (ADMELOG) 7 Unit(s) SubCutaneous three times a day before meals  metFORMIN 500 milliGRAM(s) Oral two times a day  metoprolol tartrate 50 milliGRAM(s) Oral every 8 hours  pantoprazole    Tablet 40 milliGRAM(s) Oral before breakfast  sodium chloride 0.9%. 1000 milliLiter(s) (100 mL/Hr) IV Continuous <Continuous>    MEDICATIONS  (PRN):      Allergies    No Known Allergies    Intolerances        REVIEW OF SYSTEMS:  CONSTITUTIONAL: no changes  EYES: No eye pain, visual disturbances, or discharge  ENMT:  No difficulty hearing, No sinus or throat pain  NECK: No pain or stiffness  RESPIRATORY: No cough, wheezing, chills or hemoptysis; No shortness of breath  CARDIOVASCULAR: No chest pain, palpitations or leg swelling  GASTROINTESTINAL: No abdominal or epigastric pain. No nausea, vomiting, or hematemesis; No diarrhea or constipation. No melena or hematochezia.  GENITOURINARY: No dysuria, frequency, hematuria, or incontinence  NEUROLOGICAL: No headaches, memory loss, loss of strength, numbness, or tremors  SKIN: No itching, burning, rashes, or lesions   ENDOCRINE: No heat or cold intolerance; No hair loss  MUSCULOSKELETAL: No joint pain or swelling; No muscle, back, or extremity pain  PSYCHIATRIC: No depression, anxiety, mood swings, or difficulty sleeping  HEME/LYMPH: No easy bruising, or bleeding gums  ALLERY AND IMMUNOLOGIC: No hives or eczema    Vital Signs Last 24 Hrs  T(C): 37.3 (15 Oct 2021 17:00), Max: 37.3 (15 Oct 2021 17:00)  T(F): 99.1 (15 Oct 2021 17:00), Max: 99.1 (15 Oct 2021 17:00)  HR: 120 (15 Oct 2021 17:00) (90 - 120)  BP: 108/76 (15 Oct 2021 17:00) (108/76 - 137/86)  BP(mean): --  RR: 18 (15 Oct 2021 17:00) (16 - 18)  SpO2: 99% (15 Oct 2021 17:00) (99% - 100%)    PHYSICAL EXAM:  GENERAL:   HEAD: Atraumatic, Normocephalic  EYES: PERRLA, conjunctiva and sclera clear  ENMT: No  exudates,; Moist mucous membranes,, No lesions  NECK: Supple, No JVD, Normal thyroid  NERVOUS SYSTEM:  Alert & Oriented,   CHEST/LUNG: Clear to auscultation bilaterally; No rales, rhonchi, wheezing, or rubs  HEART: Regular rate and rhythm; No murmurs, rubs, or gallops  ABDOMEN: Soft, Nontender, Nondistended; Bowel sounds present  EXTREMITIES:  2+ Peripheral Pulses, no edema  SKIN: No rashes or lesions    LABS:        CAPILLARY BLOOD GLUCOSE      POCT Blood Glucose.: 211 mg/dL (15 Oct 2021 16:08)  POCT Blood Glucose.: 198 mg/dL (15 Oct 2021 11:17)  POCT Blood Glucose.: 200 mg/dL (15 Oct 2021 07:55)  POCT Blood Glucose.: 269 mg/dL (14 Oct 2021 21:14)    Lipid panel:           Thyroid:10-15 @ 07:37 TSH 1.370 <<Free T4>> -- <<T3>> -- <<TG Ab>> -- <<ATPOAB>> -- <<TBG>> -- <<TSI>> -- Reverse T3 --    Diabetes Tests:  Parathyroid Panel:  Adrenals:  RADIOLOGY & ADDITIONAL TESTS:    Imaging Personally Reviewed:  [ ] YES  [ ] NO    Consultant(s) Notes Reviewed:  [ ] YES  [ ] NO    Care Discussed with Consultants/Other Providers [ ] YES  [ ] NO

## 2021-10-15 NOTE — DISCHARGE NOTE PROVIDER - NSDCHOSPICE_GEN_A_CORE
Daily Speech Treatment Note    Today's date: 2021  Patients name: Donavon Burkitt  : 1938  MRN: [id number]  Safety measures: allergy Morphine, MCI  Referring provider: Sri Noriega PA-C    Encounter Diagnosis     ICD-10-CM    1  Other symbolic dysfunctions  W56 5    2  Mild cognitive impairment  G31 84          Visit tracking:  -Referring provider: Epic  -Billing guidelines: CMS  -Visit #4  -Insurance: [employer] [place] auth required  -RE due 2021     Subjective/Behavioral:  The Pt arrived a 30 minutes late to the session in the clinic  The Pt reports she is doing well  The Pt states memory and thinking has been going "pretty good" and spends a lot of time reminiscing  Objective/Assessment:  -Patient's family member/caregiver was present during today's session  Short-term goals:    1  Patient will facilitate planning by completing thought organization tasks (e g , sequencing, deduction puzzles, etc ) with 70% accuracy to facilitate increased executive functioning, working memory, problem solving, and processing skills, to be achieved in 4-6 weeks  Pt participated in a sequencing activity in order to facilitate increased working memory and processing skills  When presented with pictures of a 4-step activity, Pt accurately sequenced pictures with 75% accuracy independently, increasing to 100% accuracy when provided with min cues  2  Patient will complete auditory immediate and short term memory tasks to 80% accuracy with use of compensatory strategies to facilitate increased ability to retell narratives and recall information within functional living environment, to be achieved in 4-6 weeks  The Pt accurately answered questions regarding personal past events from the current day in order to facilitate increased ability to recall information with 100% accuracy during the session      The patient completed immediate memory tasks in order to facilitate increased ability to recall information with 78% accuracy independently, increasing to 89% accuracy when provided with mod cues  3  Patient will demonstrate divided attention by responding to multiple tasks or details within tasks at the same time with cues as needed in a distracting environment with 80% accuracy, to be achieved in 4-6 weeks  4  Patient will complete concrete and abstract categorization tasks to 80% accuracy provided cues as needed to facilitate improved generative naming skills and working memory, to be achieved in 4-6 weeks  Pt participated in abstract categorization tasks in order to facilitate improved generative naming across settings (at home, in the community, etc)  Pt completed tasks with 60% accuracy independently, increasing to 100% accuracy when provided with min-mod verbal cues  5  Patient will be educated on the use of internal and external memory aids and compensatory strategies with 80% accuracy to facilitate increased recall of routine, personal information, and recent events, to be achieved in 4-6 weeks  Plan:  -Patient was provided with home exercises/activities to target goals in plan of care at the end of today's session   -Discussed session and patient's progress with caregiver/family member after today's session   -Continue with current plan of care  Patient was treated by Freddy Harrison, graduate SLP student, and was under the direct supervision of NICOLA Holloway , [address] [place] [address]  No

## 2021-10-15 NOTE — PROGRESS NOTE ADULT - SUBJECTIVE AND OBJECTIVE BOX
Patient: DIEGO FUNG [id number] 31y Male                            Hospitalist Attending Note      Seen with  [id number].  No diarrhea.  No chest pain / palpitations / SOB.        ____________________PHYSICAL EXAM:  GENERAL:  NAD Alert and Oriented x 3   HEENT: NCAT  CARDIOVASCULAR:  S1, S2  LUNGS: CTAB  ABDOMEN:  soft, (-) tenderness, (-) distension, (+) bowel sounds, (-) guarding, (-) rebound (-) rigidity  EXTREMITIES:  no cyanosis / clubbing / edema.   ____________________    VITALS:  Vital Signs Last 24 Hrs  T(C): 36.4 (15 Oct 2021 11:48), Max: 37.2 (15 Oct 2021 11:47)  T(F): 97.5 (15 Oct 2021 11:48), Max: 98.9 (15 Oct 2021 11:47)  HR: 90 (15 Oct 2021 11:48) (90 - 122)  BP: 137/86 (15 Oct 2021 11:48) (109/71 - 137/86)  BP(mean): --  RR: 16 (15 Oct 2021 11:48) (16 - 18)  SpO2: 99% (15 Oct 2021 11:48) (99% - 100%) Daily     Daily   CAPILLARY BLOOD GLUCOSE      POCT Blood Glucose.: 198 mg/dL (15 Oct 2021 11:17)  POCT Blood Glucose.: 200 mg/dL (15 Oct 2021 07:55)  POCT Blood Glucose.: 269 mg/dL (14 Oct 2021 21:14)  POCT Blood Glucose.: 126 mg/dL (14 Oct 2021 15:57)    I&O's Summary    14 Oct 2021 07:01  -  15 Oct 2021 07:00  --------------------------------------------------------  IN: 1570 mL / OUT: 0 mL / NET: 1570 mL        LABS:                        11.7   4.06  )-----------( 222      ( 14 Oct 2021 09:41 )             33.5     10-14    132<L>  |  96  |  11  ----------------------------<  230<H>  3.3<L>   |  30  |  0.51    Ca    8.9      14 Oct 2021 09:41    TPro  6.1  /  Alb  2.9<L>  /  TBili  0.2  /  DBili  .11  /  AST  282<H>  /  ALT  490<H>  /  AlkPhos  102  10-15      LIVER FUNCTIONS - ( 15 Oct 2021 07:37 )  Alb: 2.9 g/dL / Pro: 6.1 gm/dL / ALK PHOS: 102 U/L / ALT: 490 U/L / AST: 282 U/L / GGT: x                   GI PCR Panel, Stool (collected 15 Oct 2021 00:29)  Source: .Stool Feces  Final Report (15 Oct 2021 04:45):    GI PCR Results: NOT detected    *******Please Note:*******    GI panel PCR evaluates for:    Campylobacter, Plesiomonas shigelloides, Salmonella,    Vibrio, Yersinia enterocolitica, Enteroaggregative    Escherichia coli (EAEC), Enteropathogenic E.coli (EPEC),    Enterotoxigenic E. coli (ETEC) lt/st, Shiga-like    toxin-producing E. coli (STEC) stx1/stx2,    Shigella/ Enteroinvasive E. coli (EIEC), Cryptosporidium,    Cyclospora cayetanensis, Entamoeba histolytica,    Giardia lamblia, Adenovirus F 40/41, Astrovirus,    Norovirus GI/GII, Rotavirus A, Sapovirus        MEDICATIONS:  chlorhexidine 2% Cloths 1 Application(s) Topical <User Schedule>  dextrose 40% Gel 15 Gram(s) Oral once  dextrose 5%. 1000 milliLiter(s) IV Continuous <Continuous>  dextrose 5%. 1000 milliLiter(s) IV Continuous <Continuous>  dextrose 50% Injectable 25 Gram(s) IV Push once  dextrose 50% Injectable 12.5 Gram(s) IV Push once  dextrose 50% Injectable 25 Gram(s) IV Push once  enoxaparin Injectable 40 milliGRAM(s) SubCutaneous daily  glucagon  Injectable 1 milliGRAM(s) IntraMuscular once  glucagon  Injectable 1 milliGRAM(s) IntraMuscular once  influenza   Vaccine 0.5 milliLiter(s) IntraMuscular once  insulin glargine Injectable (LANTUS) 21 Unit(s) SubCutaneous at bedtime  insulin lispro (ADMELOG) corrective regimen sliding scale   SubCutaneous three times a day before meals  insulin lispro (ADMELOG) corrective regimen sliding scale   SubCutaneous at bedtime  insulin lispro Injectable (ADMELOG) 7 Unit(s) SubCutaneous three times a day before meals  metFORMIN 500 milliGRAM(s) Oral two times a day  metoprolol tartrate 25 milliGRAM(s) Oral every 12 hours  metoprolol tartrate 12.5 milliGRAM(s) Oral once  pantoprazole    Tablet 40 milliGRAM(s) Oral before breakfast  sodium chloride 0.9%. 1000 milliLiter(s) IV Continuous <Continuous>

## 2021-10-15 NOTE — DISCHARGE NOTE PROVIDER - NSDCCPCAREPLAN_GEN_ALL_CORE_FT
PRINCIPAL DISCHARGE DIAGNOSIS  Diagnosis: DKA (diabetic ketoacidosis)  Assessment and Plan of Treatment:       SECONDARY DISCHARGE DIAGNOSES  Diagnosis: Uncontrolled type 1 diabetes mellitus  Assessment and Plan of Treatment:     Diagnosis: Acute metabolic encephalopathy  Assessment and Plan of Treatment:     Diagnosis: Sinus tachycardia  Assessment and Plan of Treatment:     Diagnosis: Right lower lobe pulmonary nodule  Assessment and Plan of Treatment:      PRINCIPAL DISCHARGE DIAGNOSIS  Diagnosis: DKA (diabetic ketoacidosis)  Assessment and Plan of Treatment:       SECONDARY DISCHARGE DIAGNOSES  Diagnosis: Uncontrolled type 1 diabetes mellitus  Assessment and Plan of Treatment:     Diagnosis: Acute metabolic encephalopathy  Assessment and Plan of Treatment:     Diagnosis: Sinus tachycardia  Assessment and Plan of Treatment:     Diagnosis: Right lower lobe pulmonary nodule  Assessment and Plan of Treatment:     Diagnosis: Elevated LFTs  Assessment and Plan of Treatment:

## 2021-10-15 NOTE — DISCHARGE NOTE PROVIDER - CARE PROVIDER_API CALL
Avinash Mckenzie)  EndocrinologyMetabDiabetes  [address], [address]  [place] [address]  Phone: [phone]  Fax: [phone]  Follow Up Time:     PMD,   Phone: (   )    -  Fax: (   )    -  Follow Up Time:

## 2021-10-15 NOTE — PROGRESS NOTE ADULT - ASSESSMENT
32yo Khmer speaking male with insulin dependent DM (reports taking insulin 70/30 : 10 units PM and 30 units AM) presents from home after being found on ground in basement with AMS, admitted for DKA.    # DKA, uncontrolled DM - Ha1c 9.0.  Continue recommendations by Endocrine.  As pt uninsured, expect NPH / insulin coverage.  Counselled pt on diet modification, medication compliance and followup.    # Acute Metabolic Encephalopathy - due to above, now resolved.   # Diarrhea - Had no episodes yesterday.  Discussed need for outpatient workup with GI.  Will check stool PCR.    # Tachycardia - EKG suggests sinus tachycardia.  No s/s to suggest PE / Pheochromocytoma.  Rate now better controlled on trial of BBlocker.  Emphasized outpatient f/u.     # R pulmonary nodule - 7mm incidentally noted on CT chest.  Outpatient followup.   # Suspected GERD - trial of PPI.   # Hypothermia  - no S/s of active infection.  S/p Rocephin.  CT chest not suggestive of pneumonia.  UA, BCx unremarkable.  WBC, temp wnl.  Observe off antibiotics.  # Elevated LFTs - CT A/P was unremarkable.  Repeat LFTs has been stable.    # Inpatient DVT Prophylaxis - Lovenox subcut    Stable for d/c home pending f/u labs.

## 2021-10-15 NOTE — DISCHARGE NOTE PROVIDER - DETAILS OF MALNUTRITION DIAGNOSIS/DIAGNOSES
This patient has been assessed with a concern for Malnutrition and was treated during this hospitalization for the following Nutrition diagnosis/diagnoses:     -  10/12/2021: Severe protein-calorie malnutrition

## 2021-10-16 VITALS
OXYGEN SATURATION: 98 % | SYSTOLIC BLOOD PRESSURE: 116 MMHG | RESPIRATION RATE: 18 BRPM | HEART RATE: 110 BPM | DIASTOLIC BLOOD PRESSURE: 70 MMHG | TEMPERATURE: 99 F

## 2021-10-16 LAB
ALBUMIN SERPL ELPH-MCNC: 2.8 G/DL — LOW (ref 3.3–5)
ALP SERPL-CCNC: 97 U/L — SIGNIFICANT CHANGE UP (ref 40–120)
ALT FLD-CCNC: 539 U/L — HIGH (ref 12–78)
ANION GAP SERPL CALC-SCNC: 8 MMOL/L — SIGNIFICANT CHANGE UP (ref 5–17)
AST SERPL-CCNC: 275 U/L — HIGH (ref 15–37)
BILIRUB DIRECT SERPL-MCNC: 0.11 MG/DL — SIGNIFICANT CHANGE UP (ref 0.05–0.2)
BILIRUB INDIRECT FLD-MCNC: 0.1 MG/DL — LOW (ref 0.2–1)
BILIRUB SERPL-MCNC: 0.2 MG/DL — SIGNIFICANT CHANGE UP (ref 0.2–1.2)
BUN SERPL-MCNC: 12 MG/DL — SIGNIFICANT CHANGE UP (ref 7–23)
CALCIUM SERPL-MCNC: 8.9 MG/DL — SIGNIFICANT CHANGE UP (ref 8.5–10.1)
CHLORIDE SERPL-SCNC: 100 MMOL/L — SIGNIFICANT CHANGE UP (ref 96–108)
CO2 SERPL-SCNC: 25 MMOL/L — SIGNIFICANT CHANGE UP (ref 22–31)
CREAT SERPL-MCNC: 0.59 MG/DL — SIGNIFICANT CHANGE UP (ref 0.5–1.3)
GLUCOSE BLDC GLUCOMTR-MCNC: 179 MG/DL — HIGH (ref 70–99)
GLUCOSE BLDC GLUCOMTR-MCNC: 183 MG/DL — HIGH (ref 70–99)
GLUCOSE BLDC GLUCOMTR-MCNC: 220 MG/DL — HIGH (ref 70–99)
GLUCOSE SERPL-MCNC: 212 MG/DL — HIGH (ref 70–99)
HAV IGM SER-ACNC: SIGNIFICANT CHANGE UP
HBV CORE IGM SER-ACNC: SIGNIFICANT CHANGE UP
HBV SURFACE AG SER-ACNC: SIGNIFICANT CHANGE UP
HCT VFR BLD CALC: 31.8 % — LOW (ref 39–50)
HCV AB S/CO SERPL IA: 0.11 S/CO — SIGNIFICANT CHANGE UP (ref 0–0.99)
HCV AB SERPL-IMP: SIGNIFICANT CHANGE UP
HGB BLD-MCNC: 10.8 G/DL — LOW (ref 13–17)
MCHC RBC-ENTMCNC: 31.2 PG — SIGNIFICANT CHANGE UP (ref 27–34)
MCHC RBC-ENTMCNC: 34 GM/DL — SIGNIFICANT CHANGE UP (ref 32–36)
MCV RBC AUTO: 91.9 FL — SIGNIFICANT CHANGE UP (ref 80–100)
NRBC # BLD: 0 /100 WBCS — SIGNIFICANT CHANGE UP (ref 0–0)
PLATELET # BLD AUTO: 303 K/UL — SIGNIFICANT CHANGE UP (ref 150–400)
POTASSIUM SERPL-MCNC: 3.7 MMOL/L — SIGNIFICANT CHANGE UP (ref 3.5–5.3)
POTASSIUM SERPL-SCNC: 3.7 MMOL/L — SIGNIFICANT CHANGE UP (ref 3.5–5.3)
PROT SERPL-MCNC: 6 GM/DL — SIGNIFICANT CHANGE UP (ref 6–8.3)
RBC # BLD: 3.46 M/UL — LOW (ref 4.2–5.8)
RBC # FLD: 13.6 % — SIGNIFICANT CHANGE UP (ref 10.3–14.5)
SODIUM SERPL-SCNC: 133 MMOL/L — LOW (ref 135–145)
T4 FREE SERPL-MCNC: 1.4 NG/DL — SIGNIFICANT CHANGE UP (ref 0.9–1.8)
WBC # BLD: 5.53 K/UL — SIGNIFICANT CHANGE UP (ref 3.8–10.5)
WBC # FLD AUTO: 5.53 K/UL — SIGNIFICANT CHANGE UP (ref 3.8–10.5)

## 2021-10-16 PROCEDURE — 99239 HOSP IP/OBS DSCHRG MGMT >30: CPT

## 2021-10-16 RX ADMIN — Medication 2: at 07:52

## 2021-10-16 RX ADMIN — SODIUM CHLORIDE 100 MILLILITER(S): 9 INJECTION INTRAMUSCULAR; INTRAVENOUS; SUBCUTANEOUS at 05:37

## 2021-10-16 RX ADMIN — PANTOPRAZOLE SODIUM 40 MILLIGRAM(S): 20 TABLET, DELAYED RELEASE ORAL at 07:51

## 2021-10-16 RX ADMIN — Medication 7 UNIT(S): at 16:51

## 2021-10-16 RX ADMIN — Medication 50 MILLIGRAM(S): at 05:36

## 2021-10-16 RX ADMIN — METFORMIN HYDROCHLORIDE 500 MILLIGRAM(S): 850 TABLET ORAL at 07:51

## 2021-10-16 RX ADMIN — SODIUM CHLORIDE 100 MILLILITER(S): 9 INJECTION INTRAMUSCULAR; INTRAVENOUS; SUBCUTANEOUS at 12:05

## 2021-10-16 RX ADMIN — ENOXAPARIN SODIUM 40 MILLIGRAM(S): 100 INJECTION SUBCUTANEOUS at 11:30

## 2021-10-16 RX ADMIN — Medication 2: at 11:30

## 2021-10-16 RX ADMIN — Medication 4: at 16:51

## 2021-10-16 RX ADMIN — METFORMIN HYDROCHLORIDE 500 MILLIGRAM(S): 850 TABLET ORAL at 17:00

## 2021-10-16 RX ADMIN — Medication 7 UNIT(S): at 07:52

## 2021-10-16 RX ADMIN — Medication 50 MILLIGRAM(S): at 15:41

## 2021-10-16 RX ADMIN — Medication 7 UNIT(S): at 11:30

## 2021-10-16 NOTE — PROGRESS NOTE ADULT - PROBLEM SELECTOR PROBLEM 1
Uncontrolled type 1 diabetes mellitus

## 2021-10-16 NOTE — PROGRESS NOTE ADULT - NUTRITIONAL ASSESSMENT
This patient has been assessed with a concern for Malnutrition and has been determined to have a diagnosis/diagnoses of Severe protein-calorie malnutrition.    This patient is being managed with:   Diet Consistent Carbohydrate w/Evening Snack-  Supplement Feeding Modality:  Oral  Glucerna Shake Cans or Servings Per Day:  1       Frequency:  Three Times a day  Entered: Oct 12 2021  3:30PM    

## 2021-10-16 NOTE — PROGRESS NOTE ADULT - PROBLEM SELECTOR PLAN 1
change to Lantus 21 units and prandial lispro 7 units   add Metformin low dose BID as C-Peptide though low is still present   can be discharged on Metformin 500 mg BID and 70/30 Mix , 20un BID ( has no insurance and can not afford Lantus and prandial lispro as out-patient )
Continue with the current  regimen while inpatient   better finger sticks   can be discharged on current dose/ regimen
Continue with the current  regimen while inpatient   can be discharged on current dose/ regimen
Continue with the current  regimen while inpatient   can be discharged on current dose/ regimen

## 2021-10-16 NOTE — PROGRESS NOTE ADULT - ASSESSMENT
30yo Amharic speaking male with insulin dependent DM (reports taking insulin 70/30 : 10 units PM and 30 units AM) presents from home after being found on ground in basement with AMS, admitted for DKA.    # DKA, uncontrolled DM - Ha1c 9.0.  Continue recommendations by Endocrine.  As pt uninsured, expect NPH / insulin coverage.  Counselled pt on diet modification, medication compliance and followup.    # Acute Metabolic Encephalopathy - due to above, now resolved.   # Diarrhea - Had no episodes yesterday.  Discussed need for outpatient workup with GI.  Will check stool PCR.    # Tachycardia - EKG suggests sinus tachycardia.  No s/s to suggest PE / Pheochromocytoma.  Rate now better controlled on trial of BBlocker.  Emphasized outpatient f/u.     # R pulmonary nodule - 7mm incidentally noted on CT chest.  Outpatient followup.   # Suspected GERD - trial of PPI.   # Hypothermia  - no S/s of active infection.  S/p Rocephin.  CT chest not suggestive of pneumonia.  UA, BCx unremarkable.  WBC, temp wnl.  Observe off antibiotics.  # Elevated LFTs - CT A/P was unremarkable.  Repeat LFTs has been stable.    # Inpatient DVT Prophylaxis - Lovenox subcut    Stable for d/c home pending f/u labs.      30yo Nepali speaking male with insulin dependent DM (reports taking insulin 70/30 : 10 units PM and 30 units AM) presents from home after being found on ground in basement with AMS, admitted for DKA.  Found to have elevated LFTs.  Acute hepatitis profile and CT were unremarkable.  Also noted to have sinus tachycardia.  TTE unremarkable, TSH wnl.  BP stable.  Counselled on outpatient followup.  Also discussed R pulmonary nodule.  Emphasized need to establish regular medical care with PMD, endocrine.      # DKA, uncontrolled DM - Ha1c 9.0.  Continue recommendations by Endocrine.  As pt uninsured, expect NPH / insulin coverage.  Counselled pt on diet modification, medication compliance and followup.    # Acute Metabolic Encephalopathy - due to above, now resolved.   # Diarrhea - Had no episodes yesterday.  Discussed need for outpatient workup with GI.  Will check stool PCR.    # Tachycardia - EKG suggests sinus tachycardia.  No s/s to suggest PE / Pheochromocytoma.  Rate now better controlled on trial of BBlocker.  Emphasized outpatient f/u.     # R pulmonary nodule - 7mm incidentally noted on CT chest.  Outpatient followup.   # Suspected GERD - trial of PPI.   # Hypothermia  - no S/s of active infection.  S/p Rocephin.  CT chest not suggestive of pneumonia.  UA, BCx unremarkable.  WBC, temp wnl.  Observe off antibiotics.  # Elevated LFTs - CT A/P was unremarkable.  Repeat LFTs has been stable.    # Inpatient DVT Prophylaxis - Lovenox subcut    Stable for d/c home

## 2021-10-16 NOTE — PROGRESS NOTE ADULT - SUBJECTIVE AND OBJECTIVE BOX
Patient is a 31y old  Male who presents with a chief complaint of DKA (16 Oct 2021 10:11)      Interval History: decreased and better finger sticks and in high 100s   on Lantus 21 units and prandial lispro 7 units and Metformin 500 mg BID     MEDICATIONS  (STANDING):  chlorhexidine 2% Cloths 1 Application(s) Topical <User Schedule>  dextrose 40% Gel 15 Gram(s) Oral once  dextrose 5%. 1000 milliLiter(s) (50 mL/Hr) IV Continuous <Continuous>  dextrose 5%. 1000 milliLiter(s) (100 mL/Hr) IV Continuous <Continuous>  dextrose 50% Injectable 25 Gram(s) IV Push once  dextrose 50% Injectable 12.5 Gram(s) IV Push once  dextrose 50% Injectable 25 Gram(s) IV Push once  enoxaparin Injectable 40 milliGRAM(s) SubCutaneous daily  glucagon  Injectable 1 milliGRAM(s) IntraMuscular once  glucagon  Injectable 1 milliGRAM(s) IntraMuscular once  influenza   Vaccine 0.5 milliLiter(s) IntraMuscular once  insulin glargine Injectable (LANTUS) 21 Unit(s) SubCutaneous at bedtime  insulin lispro (ADMELOG) corrective regimen sliding scale   SubCutaneous three times a day before meals  insulin lispro (ADMELOG) corrective regimen sliding scale   SubCutaneous at bedtime  insulin lispro Injectable (ADMELOG) 7 Unit(s) SubCutaneous three times a day before meals  metFORMIN 500 milliGRAM(s) Oral two times a day  metoprolol tartrate 50 milliGRAM(s) Oral every 8 hours  pantoprazole    Tablet 40 milliGRAM(s) Oral before breakfast  sodium chloride 0.9%. 1000 milliLiter(s) (100 mL/Hr) IV Continuous <Continuous>    MEDICATIONS  (PRN):      Allergies    No Known Allergies    Intolerances        REVIEW OF SYSTEMS:  CONSTITUTIONAL: no changes  EYES: No eye pain, visual disturbances, or discharge  ENMT:  No difficulty hearing, No sinus or throat pain  NECK: No pain or stiffness  RESPIRATORY: No cough, wheezing, chills or hemoptysis; No shortness of breath  CARDIOVASCULAR: No chest pain, palpitations or leg swelling  GASTROINTESTINAL: No abdominal or epigastric pain. No nausea, vomiting, or hematemesis; No diarrhea or constipation. No melena or hematochezia.  GENITOURINARY: No dysuria, frequency, hematuria, or incontinence  NEUROLOGICAL: No headaches, memory loss, loss of strength, numbness, or tremors  SKIN: No itching, burning, rashes, or lesions   ENDOCRINE: No heat or cold intolerance; No hair loss  MUSCULOSKELETAL: No joint pain or swelling; No muscle, back, or extremity pain  PSYCHIATRIC: No depression, anxiety, mood swings, or difficulty sleeping  HEME/LYMPH: No easy bruising, or bleeding gums  ALLERY AND IMMUNOLOGIC: No hives or eczema    Vital Signs Last 24 Hrs  T(C): 37.1 (16 Oct 2021 11:17), Max: 37.3 (15 Oct 2021 17:00)  T(F): 98.8 (16 Oct 2021 11:17), Max: 99.1 (15 Oct 2021 17:00)  HR: 103 (16 Oct 2021 11:17) (94 - 120)  BP: 107/88 (16 Oct 2021 11:17) (107/88 - 112/75)  BP(mean): --  RR: 17 (16 Oct 2021 11:17) (17 - 18)  SpO2: 98% (16 Oct 2021 11:17) (98% - 99%)    PHYSICAL EXAM:  GENERAL:   HEAD: Atraumatic, Normocephalic  EYES: PERRLA, conjunctiva and sclera clear  ENMT: No  exudates,; Moist mucous membranes,, No lesions  NECK: Supple, No JVD, Normal thyroid  NERVOUS SYSTEM:  Alert & Oriented,   CHEST/LUNG: Clear to auscultation bilaterally; No rales, rhonchi, wheezing, or rubs  HEART: Regular rate and rhythm; No murmurs, rubs, or gallops  ABDOMEN: Soft, Nontender, Nondistended; Bowel sounds present  EXTREMITIES:  2+ Peripheral Pulses, no edema  SKIN: No rashes or lesions    LABS:        CAPILLARY BLOOD GLUCOSE      POCT Blood Glucose.: 179 mg/dL (16 Oct 2021 11:22)  POCT Blood Glucose.: 183 mg/dL (16 Oct 2021 07:33)  POCT Blood Glucose.: 177 mg/dL (15 Oct 2021 21:26)    Lipid panel:           Thyroid:10-16 @ 01:09 TSH -- <<Free T4>> 1.4 <<T3>> -- <<TG Ab>> -- <<ATPOAB>> -- <<TBG>> -- <<TSI>> -- Reverse T3 --    Diabetes Tests:  Parathyroid Panel:  Adrenals:  RADIOLOGY & ADDITIONAL TESTS:    Imaging Personally Reviewed:  [ ] YES  [ ] NO    Consultant(s) Notes Reviewed:  [ ] YES  [ ] NO    Care Discussed with Consultants/Other Providers [ ] YES  [ ] NO

## 2021-10-16 NOTE — DISCHARGE NOTE NURSING/CASE MANAGEMENT/SOCIAL WORK - PATIENT PORTAL LINK FT
You can access the FollowMyHealth Patient Portal offered by [facility] by registering at the following website: [email or web address]. By joining [facility]’s FollowMyHealth portal, you will also be able to view your health information using other applications (apps) compatible with our system.

## 2021-10-16 NOTE — PROGRESS NOTE ADULT - SUBJECTIVE AND OBJECTIVE BOX
Patient: DIEGO FUNG [id number] 31y Male                            Hospitalist Attending Note      Seen with  [id number].  No diarrhea.  No chest pain / palpitations / SOB.        ____________________PHYSICAL EXAM:  GENERAL:  NAD Alert and Oriented x 3   HEENT: NCAT  CARDIOVASCULAR:  S1, S2  LUNGS: CTAB  ABDOMEN:  soft, (-) tenderness, (-) distension, (+) bowel sounds, (-) guarding, (-) rebound (-) rigidity  EXTREMITIES:  no cyanosis / clubbing / edema.   ____________________    VITALS:  Vital Signs Last 24 Hrs  T(C): 36.4 (15 Oct 2021 11:48), Max: 37.2 (15 Oct 2021 11:47)  T(F): 97.5 (15 Oct 2021 11:48), Max: 98.9 (15 Oct 2021 11:47)  HR: 90 (15 Oct 2021 11:48) (90 - 122)  BP: 137/86 (15 Oct 2021 11:48) (109/71 - 137/86)  BP(mean): --  RR: 16 (15 Oct 2021 11:48) (16 - 18)  SpO2: 99% (15 Oct 2021 11:48) (99% - 100%) Daily     Daily   CAPILLARY BLOOD GLUCOSE      POCT Blood Glucose.: 198 mg/dL (15 Oct 2021 11:17)  POCT Blood Glucose.: 200 mg/dL (15 Oct 2021 07:55)  POCT Blood Glucose.: 269 mg/dL (14 Oct 2021 21:14)  POCT Blood Glucose.: 126 mg/dL (14 Oct 2021 15:57)    I&O's Summary    14 Oct 2021 07:01  -  15 Oct 2021 07:00  --------------------------------------------------------  IN: 1570 mL / OUT: 0 mL / NET: 1570 mL        LABS:                        11.7   4.06  )-----------( 222      ( 14 Oct 2021 09:41 )             33.5     10-14    132<L>  |  96  |  11  ----------------------------<  230<H>  3.3<L>   |  30  |  0.51    Ca    8.9      14 Oct 2021 09:41    TPro  6.1  /  Alb  2.9<L>  /  TBili  0.2  /  DBili  .11  /  AST  282<H>  /  ALT  490<H>  /  AlkPhos  102  10-15      LIVER FUNCTIONS - ( 15 Oct 2021 07:37 )  Alb: 2.9 g/dL / Pro: 6.1 gm/dL / ALK PHOS: 102 U/L / ALT: 490 U/L / AST: 282 U/L / GGT: x                   GI PCR Panel, Stool (collected 15 Oct 2021 00:29)  Source: .Stool Feces  Final Report (15 Oct 2021 04:45):    GI PCR Results: NOT detected    *******Please Note:*******    GI panel PCR evaluates for:    Campylobacter, Plesiomonas shigelloides, Salmonella,    Vibrio, Yersinia enterocolitica, Enteroaggregative    Escherichia coli (EAEC), Enteropathogenic E.coli (EPEC),    Enterotoxigenic E. coli (ETEC) lt/st, Shiga-like    toxin-producing E. coli (STEC) stx1/stx2,    Shigella/ Enteroinvasive E. coli (EIEC), Cryptosporidium,    Cyclospora cayetanensis, Entamoeba histolytica,    Giardia lamblia, Adenovirus F 40/41, Astrovirus,    Norovirus GI/GII, Rotavirus A, Sapovirus        MEDICATIONS:  chlorhexidine 2% Cloths 1 Application(s) Topical <User Schedule>  dextrose 40% Gel 15 Gram(s) Oral once  dextrose 5%. 1000 milliLiter(s) IV Continuous <Continuous>  dextrose 5%. 1000 milliLiter(s) IV Continuous <Continuous>  dextrose 50% Injectable 25 Gram(s) IV Push once  dextrose 50% Injectable 12.5 Gram(s) IV Push once  dextrose 50% Injectable 25 Gram(s) IV Push once  enoxaparin Injectable 40 milliGRAM(s) SubCutaneous daily  glucagon  Injectable 1 milliGRAM(s) IntraMuscular once  glucagon  Injectable 1 milliGRAM(s) IntraMuscular once  influenza   Vaccine 0.5 milliLiter(s) IntraMuscular once  insulin glargine Injectable (LANTUS) 21 Unit(s) SubCutaneous at bedtime  insulin lispro (ADMELOG) corrective regimen sliding scale   SubCutaneous three times a day before meals  insulin lispro (ADMELOG) corrective regimen sliding scale   SubCutaneous at bedtime  insulin lispro Injectable (ADMELOG) 7 Unit(s) SubCutaneous three times a day before meals  metFORMIN 500 milliGRAM(s) Oral two times a day  metoprolol tartrate 25 milliGRAM(s) Oral every 12 hours  metoprolol tartrate 12.5 milliGRAM(s) Oral once  pantoprazole    Tablet 40 milliGRAM(s) Oral before breakfast  sodium chloride 0.9%. 1000 milliLiter(s) IV Continuous <Continuous>                               Patient: DIEGO FUNG [id number] 31y Male                            Hospitalist Attending Note      Seen with  [id number].  No diarrhea.  No chest pain / palpitations / SOB.        ____________________PHYSICAL EXAM:  GENERAL:  NAD Alert and Oriented x 3   HEENT: NCAT  CARDIOVASCULAR:  S1, S2  LUNGS: CTAB  ABDOMEN:  soft, (-) tenderness, (-) distension, (+) bowel sounds, (-) guarding, (-) rebound (-) rigidity  EXTREMITIES:  no cyanosis / clubbing / edema.   ____________________    VITALS:  Vital Signs Last 24 Hrs  T(C): 37 (16 Oct 2021 05:15), Max: 37.3 (15 Oct 2021 17:00)  T(F): 98.6 (16 Oct 2021 05:15), Max: 99.1 (15 Oct 2021 17:00)  HR: 94 (16 Oct 2021 05:15) (90 - 120)  BP: 111/71 (16 Oct 2021 05:15) (108/76 - 137/86)  BP(mean): --  RR: 17 (16 Oct 2021 05:15) (16 - 18)  SpO2: 99% (16 Oct 2021 05:15) (99% - 100%) Daily     Daily   CAPILLARY BLOOD GLUCOSE      POCT Blood Glucose.: 183 mg/dL (16 Oct 2021 07:33)  POCT Blood Glucose.: 177 mg/dL (15 Oct 2021 21:26)  POCT Blood Glucose.: 211 mg/dL (15 Oct 2021 16:08)  POCT Blood Glucose.: 198 mg/dL (15 Oct 2021 11:17)    I&O's Summary    15 Oct 2021 07:01  -  16 Oct 2021 07:00  --------------------------------------------------------  IN: 2570 mL / OUT: 0 mL / NET: 2570 mL        LABS:                        10.8   5.53  )-----------( 303      ( 16 Oct 2021 07:26 )             31.8     10-16    133<L>  |  100  |  12  ----------------------------<  212<H>  3.7   |  25  |  0.59    Ca    8.9      16 Oct 2021 07:26    TPro  6.0  /  Alb  2.8<L>  /  TBili  0.2  /  DBili  .11  /  AST  275<H>  /  ALT  539<H>  /  AlkPhos  97  10-16      LIVER FUNCTIONS - ( 16 Oct 2021 07:26 )  Alb: 2.8 g/dL / Pro: 6.0 gm/dL / ALK PHOS: 97 U/L / ALT: 539 U/L / AST: 275 U/L / GGT: x                   GI PCR Panel, Stool (collected 15 Oct 2021 00:29)  Source: .Stool Feces  Final Report (15 Oct 2021 04:45):    GI PCR Results: NOT detected    *******Please Note:*******    GI panel PCR evaluates for:    Campylobacter, Plesiomonas shigelloides, Salmonella,    Vibrio, Yersinia enterocolitica, Enteroaggregative    Escherichia coli (EAEC), Enteropathogenic E.coli (EPEC),    Enterotoxigenic E. coli (ETEC) lt/st, Shiga-like    toxin-producing E. coli (STEC) stx1/stx2,    Shigella/ Enteroinvasive E. coli (EIEC), Cryptosporidium,    Cyclospora cayetanensis, Entamoeba histolytica,    Giardia lamblia, Adenovirus F 40/41, Astrovirus,    Norovirus GI/GII, Rotavirus A, Sapovirus        MEDICATIONS:  chlorhexidine 2% Cloths 1 Application(s) Topical <User Schedule>  dextrose 40% Gel 15 Gram(s) Oral once  dextrose 5%. 1000 milliLiter(s) IV Continuous <Continuous>  dextrose 5%. 1000 milliLiter(s) IV Continuous <Continuous>  dextrose 50% Injectable 25 Gram(s) IV Push once  dextrose 50% Injectable 12.5 Gram(s) IV Push once  dextrose 50% Injectable 25 Gram(s) IV Push once  enoxaparin Injectable 40 milliGRAM(s) SubCutaneous daily  glucagon  Injectable 1 milliGRAM(s) IntraMuscular once  glucagon  Injectable 1 milliGRAM(s) IntraMuscular once  influenza   Vaccine 0.5 milliLiter(s) IntraMuscular once  insulin glargine Injectable (LANTUS) 21 Unit(s) SubCutaneous at bedtime  insulin lispro (ADMELOG) corrective regimen sliding scale   SubCutaneous three times a day before meals  insulin lispro (ADMELOG) corrective regimen sliding scale   SubCutaneous at bedtime  insulin lispro Injectable (ADMELOG) 7 Unit(s) SubCutaneous three times a day before meals  metFORMIN 500 milliGRAM(s) Oral two times a day  metoprolol tartrate 50 milliGRAM(s) Oral every 8 hours  pantoprazole    Tablet 40 milliGRAM(s) Oral before breakfast  sodium chloride 0.9%. 1000 milliLiter(s) IV Continuous <Continuous>

## 2021-10-16 NOTE — PROGRESS NOTE ADULT - PROVIDER SPECIALTY LIST ADULT
Hospitalist
Critical Care
Endocrinology
Hospitalist
Hospitalist
Endocrinology
Endocrinology
Hospitalist
Hospitalist
Endocrinology

## 2021-10-25 DIAGNOSIS — E10.10 TYPE 1 DIABETES MELLITUS WITH KETOACIDOSIS WITHOUT COMA: ICD-10-CM

## 2021-10-25 DIAGNOSIS — E43 UNSPECIFIED SEVERE PROTEIN-CALORIE MALNUTRITION: ICD-10-CM

## 2021-10-25 DIAGNOSIS — E83.39 OTHER DISORDERS OF PHOSPHORUS METABOLISM: ICD-10-CM

## 2021-10-25 DIAGNOSIS — R19.7 DIARRHEA, UNSPECIFIED: ICD-10-CM

## 2021-10-25 DIAGNOSIS — K21.9 GASTRO-ESOPHAGEAL REFLUX DISEASE WITHOUT ESOPHAGITIS: ICD-10-CM

## 2021-10-25 DIAGNOSIS — E86.0 DEHYDRATION: ICD-10-CM

## 2021-10-25 DIAGNOSIS — E87.6 HYPOKALEMIA: ICD-10-CM

## 2021-10-25 DIAGNOSIS — R00.0 TACHYCARDIA, UNSPECIFIED: ICD-10-CM

## 2021-10-25 DIAGNOSIS — M62.82 RHABDOMYOLYSIS: ICD-10-CM

## 2021-10-25 DIAGNOSIS — R91.1 SOLITARY PULMONARY NODULE: ICD-10-CM

## 2021-10-25 DIAGNOSIS — G93.41 METABOLIC ENCEPHALOPATHY: ICD-10-CM

## 2025-06-16 NOTE — ED ADULT NURSE NOTE - CAS DISCH ACCOMP BY
Wound Care RN Inpatient Consult Visit     Reason for RN Consult: Assessment of coccyx/buttock area. 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 Wound has multiple pressure injury stages, the open area where the skin is sloughing off is a stage 3.  Patient is unable to get to the bathroom by herself and is incontinent at times of bowel and bladder, so a dressing is not appropriate to area as this will get saturated and trap too much moisture. Barrier cream with zinc applied after cleansing. Patient will need frequent repositioning with off-loading to buttock area.     Assessment: See Epic Wound Flowsheet for in-depth wound and dressing assessment    Last Documented Dominguez: Dominguez Scale Score: 13    Dominguez Scale Score (For Pressure Injury): HH CP DOMINGUEZ SCORE: 13-14 = Moderate Risk    Wound Care RN Recommendations:    Coccyx/buttock: Cleanse with soap and water, pat dry and apply barrier cream with zinc BID and PRN. Q2H turn side to side with pillow and/or wedge support, alternating pressure mattress, air cushion to chair, Prevalon boots when in bed.      Images of Wounds:         Recommended Devices/Interventions: Chair cusion - air, Heel off loading devices, and alternating pressure mattress, Q2H turn/repositioning.        Family () Education:  Need for frequent repositioning, turning side to side in bed to relieve pressure off of buttock area, good pressure reliving cushion to chair, off-loading bony areas to prevent skin breakdown.     Plan: Wound Care team will follow up with patient next week if patient still admitted.     Time Spent for Consult: 20 minutes with patient    RN